# Patient Record
Sex: FEMALE | Race: OTHER | HISPANIC OR LATINO | Employment: FULL TIME | URBAN - METROPOLITAN AREA
[De-identification: names, ages, dates, MRNs, and addresses within clinical notes are randomized per-mention and may not be internally consistent; named-entity substitution may affect disease eponyms.]

---

## 2020-09-11 ENCOUNTER — HOSPITAL ENCOUNTER (OUTPATIENT)
Dept: LAB | Facility: MEDICAL CENTER | Age: 60
End: 2020-09-11
Attending: FAMILY MEDICINE
Payer: COMMERCIAL

## 2020-09-11 LAB — COVID ORDER STATUS COVID19: NORMAL

## 2020-09-11 PROCEDURE — U0003 INFECTIOUS AGENT DETECTION BY NUCLEIC ACID (DNA OR RNA); SEVERE ACUTE RESPIRATORY SYNDROME CORONAVIRUS 2 (SARS-COV-2) (CORONAVIRUS DISEASE [COVID-19]), AMPLIFIED PROBE TECHNIQUE, MAKING USE OF HIGH THROUGHPUT TECHNOLOGIES AS DESCRIBED BY CMS-2020-01-R: HCPCS

## 2020-09-11 PROCEDURE — C9803 HOPD COVID-19 SPEC COLLECT: HCPCS

## 2020-09-12 LAB
SARS-COV-2 RNA RESP QL NAA+PROBE: NOTDETECTED
SPECIMEN SOURCE: NORMAL

## 2020-12-21 ENCOUNTER — APPOINTMENT (OUTPATIENT)
Dept: RADIOLOGY | Facility: IMAGING CENTER | Age: 60
End: 2020-12-21
Attending: PHYSICIAN ASSISTANT
Payer: COMMERCIAL

## 2020-12-21 ENCOUNTER — OFFICE VISIT (OUTPATIENT)
Dept: URGENT CARE | Facility: CLINIC | Age: 60
End: 2020-12-21
Payer: COMMERCIAL

## 2020-12-21 VITALS
HEIGHT: 59 IN | OXYGEN SATURATION: 95 % | TEMPERATURE: 97.8 F | DIASTOLIC BLOOD PRESSURE: 62 MMHG | RESPIRATION RATE: 16 BRPM | HEART RATE: 68 BPM | SYSTOLIC BLOOD PRESSURE: 104 MMHG | BODY MASS INDEX: 26 KG/M2 | WEIGHT: 129 LBS

## 2020-12-21 DIAGNOSIS — R10.84 GENERALIZED ABDOMINAL PAIN: ICD-10-CM

## 2020-12-21 DIAGNOSIS — K59.00 CONSTIPATION, UNSPECIFIED CONSTIPATION TYPE: ICD-10-CM

## 2020-12-21 PROCEDURE — 99204 OFFICE O/P NEW MOD 45 MIN: CPT | Performed by: PHYSICIAN ASSISTANT

## 2020-12-21 PROCEDURE — 74019 RADEX ABDOMEN 2 VIEWS: CPT | Mod: TC | Performed by: PHYSICIAN ASSISTANT

## 2020-12-21 RX ORDER — VITAMIN B COMPLEX
1000 TABLET ORAL DAILY
COMMUNITY

## 2020-12-21 RX ORDER — LEVOFLOXACIN 750 MG/1
750 TABLET, FILM COATED ORAL DAILY
COMMUNITY

## 2020-12-21 RX ORDER — NICOTINE POLACRILEX 2 MG
GUM BUCCAL
COMMUNITY

## 2020-12-21 SDOH — HEALTH STABILITY: MENTAL HEALTH: HOW OFTEN DO YOU HAVE A DRINK CONTAINING ALCOHOL?: MONTHLY OR LESS

## 2020-12-21 ASSESSMENT — ENCOUNTER SYMPTOMS
CONSTIPATION: 1
ABDOMINAL PAIN: 1

## 2020-12-22 NOTE — PROGRESS NOTES
"Subjective:     Annie Kramer  is a 60 y.o. female who presents for Constipation (constipation for 3 weeks)    This is a new problem.  Patient presents to urgent care with 3-week history of constipation and generalized abdominal pain.  Patient reports that she is able to pass gas but is having great difficulty passing bowel movements.  She did try taking some over-the-counter psyllium tablets with no relief.  She spoke with her gynecologist who recommended that she try MiraLAX.  She has had 3 doses with no relief.  One of her providers placed a referral to gastroenterology however she is not able to get an appointment for 3 months.  Patient denies any fever or chills, vomiting.     Constipation  Associated symptoms include abdominal pain.         Review of Systems   Gastrointestinal: Positive for abdominal pain and constipation.   All other systems reviewed and are negative.    Allergies   Allergen Reactions   • Almitrine      Break out   • Amoxicillin      rashes   • Bactrim Ds      Breaking out     • Keflex      Rashes     • Penicillins      rashes   • Sulfa Drugs      rashes   • Zithromax [Azithromycin-Fd&C Blue #2 [place]]      Breaking out       Past medical history, family history, surgical history and social history are reviewed and updated in the record today.     Objective:   /62   Pulse 68   Temp 36.6 °C (97.8 °F)   Resp 16   Ht 1.499 m (4' 11\")   Wt 58.5 kg (129 lb)   SpO2 95%   BMI 26.05 kg/m²   Physical Exam  Vitals signs reviewed.   Constitutional:       General: She is not in acute distress.     Appearance: She is well-developed. She is not ill-appearing or toxic-appearing.   HENT:      Head: Normocephalic and atraumatic.      Right Ear: Tympanic membrane, ear canal and external ear normal.      Left Ear: Tympanic membrane, ear canal and external ear normal.      Nose: Nose normal.      Mouth/Throat:      Lips: Pink. No lesions.      Mouth: Mucous membranes are moist.      " Pharynx: Oropharynx is clear. Uvula midline. No oropharyngeal exudate.   Eyes:      General: Lids are normal.      Extraocular Movements: Extraocular movements intact.      Conjunctiva/sclera: Conjunctivae normal.      Pupils: Pupils are equal, round, and reactive to light.   Neck:      Musculoskeletal: Normal range of motion and neck supple.   Cardiovascular:      Rate and Rhythm: Normal rate and regular rhythm.      Heart sounds: Normal heart sounds. No murmur. No friction rub. No gallop.    Pulmonary:      Effort: Pulmonary effort is normal. No respiratory distress.      Breath sounds: Normal breath sounds.   Abdominal:      General: Bowel sounds are normal. There is no distension.      Palpations: Abdomen is soft. There is no mass.      Tenderness: There is generalized abdominal tenderness. There is no right CVA tenderness, left CVA tenderness, guarding or rebound. Negative signs include Siddiqui's sign and Rovsing's sign.   Musculoskeletal: Normal range of motion.         General: No tenderness or deformity.   Lymphadenopathy:      Head:      Right side of head: No submental, submandibular or tonsillar adenopathy.      Left side of head: No submental, submandibular or tonsillar adenopathy.      Cervical: No cervical adenopathy.      Upper Body:      Right upper body: No supraclavicular adenopathy.      Left upper body: No supraclavicular adenopathy.   Skin:     General: Skin is warm and dry.      Findings: No rash.   Neurological:      Mental Status: She is alert and oriented to person, place, and time.      Cranial Nerves: Cranial nerves are intact. No cranial nerve deficit.      Sensory: Sensation is intact. No sensory deficit.      Motor: Motor function is intact.      Coordination: Coordination is intact. Coordination normal.      Gait: Gait is intact.   Psychiatric:         Attention and Perception: Attention normal.         Mood and Affect: Mood and affect normal.         Speech: Speech normal.          Behavior: Behavior normal. Behavior is cooperative.         Thought Content: Thought content normal.         Judgment: Judgment normal.          Assessment/Plan:   1. Constipation, unspecified constipation type  - [id number] VIEWS; Future    2. Generalized abdominal pain  - [id number] VIEWS; Future    Start time: 7: 05  End Time:8:08  Total face to face time with patient: 47 ,minutes      Patient is extremely concerned about her cause for constipation.  She has multiple questions and concerns regarding abdominal pain, treatment already initiated for constipation, why treatments are not working, what additional treatments she could utilize, potential causes for her problem, potential adverse events related to constipation, etc.  Much time spent counseling patient regarding constipation, management, etc.    Given her continued ongoing concerns, x-rays obtained.    Xray is obtained, read by radiologist and reviewed by myself with findings as follows:      RADIOLOGY RESULTS   [id number] Views    Result Date: 12/21/2020 12/21/2020 7:48 PM HISTORY/REASON FOR EXAM:  Abdominal Pain; 3 weeks constipation, generalized abdominal pain. TECHNIQUE/EXAM DESCRIPTION AND NUMBER OF VIEWS:  2 supine and upright view(s) of the abdomen. COMPARISON: None FINDINGS: No free air is identified on this supine exam. There is an increased volume of stool throughout the colon consistent with constipation. No small bowel dilatation, organomegaly, or aberrant calcification is present.     Constipation pattern of the colon.           Significant reassurance and education is provided.  Recommend trial of fleets enema.  If this is not successful, recommend trial of magnesium citrate over-the-counter.  Patient may also consider utilization of MiraLAX up to 3 times per day as needed.  Handout provided regarding constipation with recommendation for stool softener, daily MiraLAX, daily fiber, ensuring she is getting enough water to drink in the  amount of approximately 64 ounces per day.  Recommend follow-up with primary care, recommend follow-up with GI once established and scheduled.    Upon entering exam room I ensured patient was wearing a mask.  This provider wore appropriate PPE throughout entire visit.  Patient wore mask entire visit except for a brief period while examining oropharynx.      Differential diagnosis, natural history, supportive care, and indications for immediate follow-up discussed.  Red flag warning symptoms and strict ER/follow-up precautions given.  The patient demonstrated a good understanding and agreed with the treatment plan.  Please note that this note was created using voice recognition speech to text software. Every effort has been made to correct obvious errors.  However, I expect there are errors of grammar and possibly context that were not discovered prior to finalizing the note  KIMBERLEY Pratt PA-C

## 2022-10-11 ENCOUNTER — APPOINTMENT (OUTPATIENT)
Dept: SLEEP MEDICINE | Facility: MEDICAL CENTER | Age: 62
End: 2022-10-11
Payer: COMMERCIAL

## 2023-01-27 ENCOUNTER — APPOINTMENT (OUTPATIENT)
Dept: SLEEP MEDICINE | Facility: MEDICAL CENTER | Age: 63
End: 2023-01-27
Payer: COMMERCIAL

## 2023-01-31 ENCOUNTER — OFFICE VISIT (OUTPATIENT)
Dept: SLEEP MEDICINE | Facility: MEDICAL CENTER | Age: 63
End: 2023-01-31
Payer: COMMERCIAL

## 2023-01-31 VITALS
OXYGEN SATURATION: 96 % | HEIGHT: 59 IN | SYSTOLIC BLOOD PRESSURE: 122 MMHG | BODY MASS INDEX: 26.65 KG/M2 | DIASTOLIC BLOOD PRESSURE: 78 MMHG | HEART RATE: 65 BPM | WEIGHT: 132.2 LBS | RESPIRATION RATE: 16 BRPM

## 2023-01-31 DIAGNOSIS — G47.33 OSA (OBSTRUCTIVE SLEEP APNEA): ICD-10-CM

## 2023-01-31 PROCEDURE — 99204 OFFICE O/P NEW MOD 45 MIN: CPT | Performed by: INTERNAL MEDICINE

## 2023-01-31 RX ORDER — LEVOTHYROXINE SODIUM 0.07 MG/1
75 TABLET ORAL DAILY
COMMUNITY

## 2023-01-31 ASSESSMENT — FIBROSIS 4 INDEX: FIB4 SCORE: 1.22

## 2023-01-31 NOTE — PROGRESS NOTES
CC: Follow-up for BRITNEY on CPAP at 12 cm water    HPI:  Annie Kramer is a 62 y.o. female kindly referred by Jorje Fernandez M.D. and Penelope Mcfarland MD for reevaluation of previously diagnosed BRITNEY.  Reportedly she had a sleep study done at [facility] several years ago and was placed on CPAP.  However, she has not used CPAP in the last 2 months does not wish to use it going forward.  Furthermore she evidently quested to have a female MD see her rather than a male physician.    We are trying to get a hold of [facility] to see if we can gain the compliance data.  We do not have any the records from [facility].    Unable to use her machine and her  is angry about her snoring. Was sent to a dentist for an appliance but she was concerned about expense and side effects.  Tried 3 different masks but couldn't find one that worked for her.        Her total Stewart score today is 9 out of 24.  She has identifies sleep apnea and loud snoring as her major problems.  She does suffer from sinus problems, allergies, and has had prior nasal surgery.    He generally goes to bed at 10 PM and falls asleep in about half an hour waking up at about 6 AM.  She experiences 3 nocturnal awakenings and fails to feel refreshed in the morning.  She does not nap during the day.  On the weekends she may stay up later and get up later by an hour or so.  She needs about 8 hours of sleep to feel rested.  She may watch TV in bed and does pets in the bedroom.  She has a regular bed partner.  She generally keeps her room at about 60 °F.  She does not use THC, CBD, or alcohol but does drink a couple of coffee in the morning.  She generally works about 8 hours.  For all the time symptoms include snoring.  Her most of the time symptoms include lack of sexual interest, irritability, and teeth grinding.  Her sometimes symptoms include sleepiness during the day, fatigue, sleep onset insomnia, GERD, night sweats, awakening with headaches,  difficulty breathing through her nose, choking on food, muscle weakness when laughing, difficulty concentrating, depression, and anxiety.      Past medical history significant for BRITNEY, anxiety, hypothyroidism, depression, anxiety, prediabetes, kidney stones, and former smoker.                Patient Active Problem List    Diagnosis Date Noted    BRITNEY (obstructive sleep apnea) 01/31/2023       Past Medical History:   Diagnosis Date    Chickenpox         History reviewed. No pertinent surgical history.    History reviewed. No pertinent family history.    Social History     Socioeconomic History    Marital status:      Spouse name: Not on file    Number of children: Not on file    Years of education: Not on file    Highest education level: Not on file   Occupational History    Not on file   Tobacco Use    Smoking status: Never    Smokeless tobacco: Never   Vaping Use    Vaping Use: Never used   Substance and Sexual Activity    Alcohol use: Yes     Comment: occ    Drug use: Not Currently    Sexual activity: Not on file   Other Topics Concern    Not on file   Social History Narrative    Not on file     Social Determinants of Health     Financial Resource Strain: Not on file   Food Insecurity: Not on file   Transportation Needs: Not on file   Physical Activity: Not on file   Stress: Not on file   Social Connections: Not on file   Intimate Partner Violence: Not on file   Housing Stability: Not on file       Current Outpatient Medications   Medication Sig Dispense Refill    Multiple Vitamins-Minerals (ZINC PO) Take  by mouth.      Cyanocobalamin (VITAMIN B 12 PO) Take  by mouth.      Coenzyme Q10 (COQ10 PO) Take  by mouth.      levothyroxine (SYNTHROID) 75 MCG Tab Take 75 mcg by mouth every day.      sertraline (ZOLOFT) 50 MG Tab Take 25 mg by mouth every day.      vitamin D (CHOLECALCIFEROL) 1000 Unit (25 mcg) Tab Take 1,000 Units by mouth every day.      Biotin 1 MG Cap Take  by mouth.      levoFLOXacin (LEVAQUIN)  "750 MG tablet Take 750 mg by mouth every day. (Patient not taking: Reported on 1/31/2023)       No current facility-administered medications for this visit.    \"CURRENT RX\"    ALLERGIES: Almitrine, Amoxicillin, Bactrim ds, Keflex, Penicillins, Sulfa drugs, and Zithromax [azithromycin-fd&c blue #2 [place]]    ROS    Constitutional: Denies fever, chills, sweats,  weight loss, fatigue.  Eyes: Denies vision loss, pain, drainage, double vision, glasses.  Ears/Nose/Mouth/Throat: Denies earache, difficulty hearing, rhinitis/nasal congestion, injury, recurrent sore throat, persistent hoarseness, decayed teeth/toothaches, ringing or buzzing in the ears.  Cardiovascular: Denies chest pain, tightness, palpitations, swelling in legs/feet, fainting, difficulty breathing when lying down but gets better when sitting up.   Respiratory: Denies shortness of breath, cough, sputum, wheezing, painful breathing, coughing up blood.   Sleep: per HPI  Gastrointestinal: Denies  difficulty swallowing, nausea, abdominal pain, diarrhea, constipation, heartburn.  Genitourinary: Denies  blood in urine, discharge, frequent urination.   Musculoskeletal: Denies painful joints, sore muscles, back pain.   Integumentary: Denies rashes, lumps, color changes.   Neurological: Denies frequent headaches,weakness, dizziness.    PHYSICAL EXAM      /78 (BP Location: Left arm, Patient Position: Sitting, BP Cuff Size: Adult)   Pulse 65   Resp 16   Ht 1.499 m (4' 11\")   Wt 60 kg (132 lb 3.2 oz)   SpO2 96%   BMI 26.70 kg/m²   Appearance: Well-nourished, well-developed, no acute distress  Eyes:  PERRLA, EOMI  ENMT: masked  Neck: Supple, trachea midline, no masses  Respiratory effort:  No intercostal retractions or use of accessory muscles  Lung auscultation:  No wheezes rhonchi rubs or rales  Cardiac: No murmurs, rubs, or gallops; regular rhythm, normal rate; no edema  Abdomen:  No tenderness, no organomegaly  Musculoskeletal:  Grossly normal; " gait and station normal; digits and nails normal  Skin:  No rashes, petechiae, cyanosis  Neurologic: without focal signs; oriented to person, time, place, and purpose; judgement intact  Psychiatric:  No depression, anxiety, agitation        Medical Decision Making:  The medical record was reviewed in its entirety including the referral notes, records from primary care, consultants notes, hospital records, labs, imaging, microbiology, immunology, and immunizations.  Care gaps identified and reviewed with the patient.    Diagnostic and titration nocturnal polysomnograms, home sleep apnea tests, continuous nocturnal oximetry results, multiple sleep latency tests, and compliance reports reviewed.        1. BRITNEY (obstructive sleep apnea)    Other orders  - Multiple Vitamins-Minerals (ZINC PO); Take  by mouth.  - Cyanocobalamin (VITAMIN B 12 PO); Take  by mouth.  - Coenzyme Q10 (COQ10 PO); Take  by mouth.  - levothyroxine (SYNTHROID) 75 MCG Tab; Take 75 mcg by mouth every day.      PLAN:   Thus far she is proven to be CPAP intolerant.  She is not been able to find a mask that seems to work for her.  She was then subsequently referred for a dental appliance but felt that they were too expensive and might not possibly work so she did not have one made.  We discussed behavioral modification and nasopharyngeal reconstructive surgery is other potential options.  Her BMI lowest not high and weight loss would likely not lead to a resolution of her sleep apnea.  Certainly would recommend surgery especially not knowing the severity of her BRITNEY.    We also discussed the hypopossible nerve stimulation implant.  We discussed the steps necessary enrollment.  I asked her to go to the inspire website for a review.  Since her last sleep study was done over 3 years ago we will do an updated home sleep apnea test to determine if she is a candidate.  She was interested in seeing a female physician so when she returns we will have her see   [facility] and they can discuss her inspire candidacy further.    The risks of untreated sleep apnea were discussed with the patient at length. Patients with BRITNEY are at increased risk of cardiovascular disease including systemic arterial hypertension, pulmonary arterial hypertension (transient or fixed), TIA, and an elevated risk of stroke, heart attack, sudden death, or arrhythmia between the hours of 11 PM and 6 AM. BRITNEY patients have an increased risk of motor vehicle accidents, type 2 diabetes, GERD, repetitive mechanical trauma to pharyngeal muscles with inflammation and denervation, frequent EEG arousals leading to nonrestorative sleep, excessive daytime sleepiness, fatigue, depression, poor pain control, irritability, and lower quality of life.  The patient was advised to avoid driving a motor vehicle when drowsy.    Positive airway pressure will favorably impact many of the adverse conditions and effects provoked by BRITNEY.    Have advised the patient to follow up with the appropriate healthcare practitioners for all other medical problems and issues. Discussed blood pressure management if needed. Discussed depression screening.            Return in about 1 year (around 1/31/2024).    Total time 45 minutes

## 2023-04-07 ENCOUNTER — APPOINTMENT (OUTPATIENT)
Dept: SLEEP MEDICINE | Facility: MEDICAL CENTER | Age: 63
End: 2023-04-07
Attending: INTERNAL MEDICINE
Payer: COMMERCIAL

## 2023-04-11 ENCOUNTER — HOME STUDY (OUTPATIENT)
Dept: SLEEP MEDICINE | Facility: MEDICAL CENTER | Age: 63
End: 2023-04-11
Attending: INTERNAL MEDICINE
Payer: COMMERCIAL

## 2023-04-11 DIAGNOSIS — G47.33 OSA (OBSTRUCTIVE SLEEP APNEA): ICD-10-CM

## 2023-04-11 PROCEDURE — 95800 SLP STDY UNATTENDED: CPT | Performed by: INTERNAL MEDICINE

## 2023-04-15 NOTE — PROCEDURES
DIAGNOSTIC HOME SLEEP TEST (HST) REPORT WatchPAT      PATIENT ID:  NAME:  Annie Kramer  MRN:               [id number]  YOB: 1960  Date of study: 04/11/2023  Sleep Time: 7 hours 4 minutes  Portable monitor/device used: type IV (portable monitor/device)      Impression:        1.  Moderate positional obstructive sleep apnea hypopnea with a PAT apnea hypopnea index (pAHI) of 21.8 (events per hour), a supine pAHI of 25.3, and a PAT respiratory disturbance index (pRDI) of 23.3 (events per hour.)  These findings are based on 7 channels recording of PAT signal with sleep staging, heart rate, pulse oximetry, actigraphy, body position, snoring and respiratory movement.     2. Oxygenation: O2 Sat. mean O2 sat was 89%,  the shakeel was 82%,  and the maximum O2 was 95%.  The O2 sat was at or  below 88% for 44.6 minutes of evaluation time. Oxygen Desaturation (>=4%) Index was 9.7 (events per hour.)  The mean HR was 47 BPM.      TECHNICAL DESCRIPTION: The patient underwent home sleep apnea testing using peripheral arterial tone signal technology(WatchPAT™). Monitoring was done with 7 channels recording of PAT signal with sleep staging, heart rate, pulse oximetry, actigraphy, body position, snoring and respiratory movement. Prior to using the device, the patient received verbal and written instructions for its application and was provided with the help desk phone number for additional telephonic instruction with 24-hour availability of qualified personnel to answer questions.      Respiratory events:    pRDI: Total 164 (REM index 36.4 /hr, NREM index 20.2 /hr, All Night 23.3 /hr)    3% pAHI: Total 153 (REM index 34.2 /hr, NREM index 18.8 /hr, All Night 21.8 /hr)    3% pAHIc: Total 4 (REM index 0.9 /hr, NREM index 0.6 /hr, All Night 0.6 /hr)    4% pAHI: Total 68 (All Night 9.7 /hr)    General sleep summary: . Total Recording Time is 7 hours 41 minutes and Total Sleep Time is 7 hours 4 minutes. The patient spent  279.4 minutes in the supine position and 145 minutes in the nonsupine position.  Snoring exceeded the 45 dB threshold for 0.0 minutes or 0% of the TST.      Recommendations:    1.  Options for PAP therapy include an in lab attended Pap titration versus an empiric challenge with auto titrating CPAP.  2.  Patients with sleep apnea are advised to avoid alcohol and sedatives and not to operate a motor vehicle while drowsy. In some cases alternative treatment options may prove effective in managing sleep apnea.  Alternative therapies include upper airway surgery, the use of a dental orthotic, weight loss and/or positional therapy. Clinical correlation is required.            Dr. Ant Banegas M.D.

## 2023-05-23 ENCOUNTER — OFFICE VISIT (OUTPATIENT)
Dept: SLEEP MEDICINE | Facility: MEDICAL CENTER | Age: 63
End: 2023-05-23
Attending: NURSE PRACTITIONER
Payer: COMMERCIAL

## 2023-05-23 VITALS
WEIGHT: 130.1 LBS | OXYGEN SATURATION: 96 % | SYSTOLIC BLOOD PRESSURE: 118 MMHG | HEART RATE: 65 BPM | HEIGHT: 59 IN | RESPIRATION RATE: 16 BRPM | DIASTOLIC BLOOD PRESSURE: 60 MMHG | BODY MASS INDEX: 26.23 KG/M2

## 2023-05-23 DIAGNOSIS — G47.33 OSA (OBSTRUCTIVE SLEEP APNEA): ICD-10-CM

## 2023-05-23 PROCEDURE — 3074F SYST BP LT 130 MM HG: CPT | Performed by: NURSE PRACTITIONER

## 2023-05-23 PROCEDURE — 3078F DIAST BP <80 MM HG: CPT | Performed by: NURSE PRACTITIONER

## 2023-05-23 PROCEDURE — 99212 OFFICE O/P EST SF 10 MIN: CPT | Performed by: NURSE PRACTITIONER

## 2023-05-23 PROCEDURE — 99214 OFFICE O/P EST MOD 30 MIN: CPT | Performed by: NURSE PRACTITIONER

## 2023-05-23 ASSESSMENT — FIBROSIS 4 INDEX: FIB4 SCORE: 1.22

## 2023-05-23 NOTE — PROGRESS NOTES
Chief Complaint   Patient presents with    Apnea     SS results       HPI:  Annie Kramer is a 62 y.o. year old female here today for follow-up on study results.  Last seen 1/31/2023 by Dr. Banegas.  Patient was previously diagnosed with BRITNEY.  Previous sleep study was done [facility] several years ago and was placed on CPAP.  Patient has not used CPAP since October 2022.  Patient was unable to use her CPAP, but had persistent snoring.  She was sent to a dentist for dental appliance, but concerned about expense and side effects.    Patient did sleep study to assess severity and rediagnosed.  Inspire device was discussed as well.  Was originally set up on CPAP in 12/21/2017.  She was using a ResMed N30 I.  Patient had severe snoring before treatment, but during treatment also experience snoring where her  moved into another room to sleep.  He also notes that the mask was out of place due to simple strapping and her thick hair.    Compliance data reviewed between January 31 and March 1, 2018 shows 4 days use with an average time of 6 hours and 31 minutes.  Previously on auto CPAP 4 to 8 cm/H2O.  AHI was 2.3.  No evidence of excessive mask leak.     Home sleep study (4/11/2023):   1.  Moderate positional obstructive sleep apnea hypopnea with a PAT apnea hypopnea index (pAHI) of 21.8 (events per hour), a supine pAHI of 25.3, and a PAT respiratory disturbance index (pRDI) of 23.3 (events per hour.)  These findings are based on 7 channels recording of PAT signal with sleep staging, heart rate, pulse oximetry, actigraphy, body position, snoring and respiratory movement.      2. Oxygenation: O2 Sat. mean O2 sat was 89%,  the shakeel was 82%,  and the maximum O2 was 95%.  The O2 sat was at or  below 88% for 44.6 minutes of evaluation time. Oxygen Desaturation (>=4%) Index was 9.7 (events per hour.)  The mean HR was 47 BPM.       ROS: As per HPI and otherwise negative if not stated.    Past Medical History:  "  Diagnosis Date    Chickenpox        History reviewed. No pertinent surgical history.    History reviewed. No pertinent family history.    Allergies as of 05/23/2023 - Reviewed 05/23/2023   Allergen Reaction Noted    Almitrine  12/21/2020    Amoxicillin  12/21/2020    Bactrim ds  12/21/2020    Keflex  12/21/2020    Penicillins  12/21/2020    Sulfa drugs  12/21/2020    Zithromax [azithromycin-fd&c blue #2 [place]]  12/21/2020        Vitals:  /60 (BP Location: Left arm, Patient Position: Sitting, BP Cuff Size: Adult)   Pulse 65   Resp 16   Ht 1.499 m (4' 11\")   Wt 59 kg (130 lb 1.6 oz)   SpO2 96%     Current medications as of today   Current Outpatient Medications   Medication Sig Dispense Refill    Cholecalciferol 75 MCG (3000 UT) Tab Take 3,000 Units by mouth every day.      cyanocobalamin (VITAMIN B12) 1000 MCG Tab Take 5,000 mcg by mouth every day.      Magnesium Oxide 250 MG Tab Take 750 mg by mouth every day.      Omega-3 300 MG Cap Take 4 Capsules by mouth every day.      Multiple Vitamins-Minerals (ZINC PO) Take  by mouth.      Cyanocobalamin (VITAMIN B 12 PO) Take  by mouth.      Coenzyme Q10 (COQ10 PO) Take  by mouth.      levothyroxine (SYNTHROID) 75 MCG Tab Take 75 mcg by mouth every day.      levoFLOXacin (LEVAQUIN) 750 MG tablet Take 750 mg by mouth every day.      sertraline (ZOLOFT) 50 MG Tab Take 25 mg by mouth every day.      vitamin D (CHOLECALCIFEROL) 1000 Unit (25 mcg) Tab Take 1,000 Units by mouth every day.      Biotin 1 MG Cap Take  by mouth.       No current facility-administered medications for this visit.         Physical Exam:   Gen:           Alert and oriented, No apparent distress. Mood and affect appropriate, normal interaction with examiner.  Eyes:          PERRL, EOM intact, sclere white, conjunctive moist.  Ears:          Not examined.   Hearing:     Grossly intact.  Nose:          Normal, no lesions or deformities.  Dentition:    Good dentition.  Oropharynx: "   Tongue normal, posterior pharynx without erythema or exudate.  Neck:        Supple, trachea midline, no masses.  Respiratory Effort: No intercostal retractions or use of accessory muscles.   Lung Auscultation:      Clear to auscultation bilaterally; no rales, rhonchi or wheezing.  CV:            Regular rate and rhythm. No murmurs, rubs or gallops.  Abd:           Not examined.   Lymphadenopathy: Not examined.  Gait and Station: Normal.  Digits and Nails: No clubbing, cyanosis, petechiae, or nodes.   Cranial Nerves: II-XII grossly intact.  Skin:        No rashes, lesions or ulcers noted.               Ext:           No cyanosis or edema.      Assessment:  1. BRITNEY (obstructive sleep apnea)            Plan:   I reviewed with the patient the pathophysiology of obstructive sleep apnea, as well as potential cardiac and neurologic risks associated with untreated sleep apnea including CAD, HTN, pulmonary arterial hypertension, cardiac arrhythmias, heart attack or stroke.  BRITNEY patient's have increased risk of motor vehicle accidents, DM type II, chronic kidney disease and nonalcoholic liver disease.  She is cautioned against driving while sleepy for her safety and safety of others on the road. We reviewed treatment modalities for sleep apnea including CPAP/BiPAP therapy, ENT referral, dental appliance.      Patient presents for review of sleep study which showed moderate BRITNEY with nocturnal hypoxia.  Pradip treatment options at this time patient would not like to move forward with any invasive procedures.  Patient was also to be fitted for dental appliance, but decided against it.  Mask fitting was provided for patient today and patient was given an Bryon fullface mask.  Patient prefers fit of this mask over previous.  Patient to use CPAP nightly for at least 4 hours for optimal benefit.  Will return in 4 weeks for reassessment on compliance.    Please note that this dictation was created using voice recognition software. I  have made every reasonable attempt to correct obvious errors, but it is possible there are errors of grammar and possibly content that I did not discover before finalizing the note.

## 2023-07-14 NOTE — PROGRESS NOTES
[facility] Follow-up Visit    Date of Visit: 8/1/2023     CC:  Follow-up for BRITNEY management      HPI:  Annie Kramer is a very pleasant 62 y.o. year old female never smoker, with a PMHx of BRITNEY on CPAP, HLD who presented to the [facility] for a regular follow up. Last seen in the office on 5/23/2023 with TEODORO Duong.     Patient presents for compliance.  ***    Sleep more restful with CPAP usage    Denies morning headaches.    Denies daytime drowsiness / driving drowsy.     Denies any issues falling asleep.      Snoring / Gasping / Apneas    Palpitations    Dry mouth    Aerophagia    Mask leak / Skin irritation    Goes to bed:  Wakes up:  Naps (frequency and duration):  Awakenings:  Issues falling back to sleep?        DME provider: ***  Device: ***  Settings:***  When:***  Mask: ***  Chin strap: {YES/NO:20266}    Cleaning regimen: ***    Compliance:  Compliance data reviewed showing ***% usage > 4hours in last 30 *** days. Average AHI *** events/hour. 95% pressure *** CWP. 95% leaks *** L/min. Patient continues to use and benefit from machine. ***     Sleep History:  HSS 4/11/2023-      Patient Active Problem List    Diagnosis Date Noted    BRITNEY (obstructive sleep apnea) 01/31/2023     Past Medical History:   Diagnosis Date    Chickenpox       No past surgical history on file.  No family history on file.  Social History     Socioeconomic History    Marital status:      Spouse name: Not on file    Number of children: Not on file    Years of education: Not on file    Highest education level: Not on file   Occupational History    Not on file   Tobacco Use    Smoking status: Never    Smokeless tobacco: Never   Vaping Use    Vaping Use: Never used   Substance and Sexual Activity    Alcohol use: Yes     Comment: occ    Drug use: Not Currently    Sexual activity: Not on file   Other Topics Concern    Not on file   Social History Narrative    Not on file     Social Determinants of Health      Financial Resource Strain: Not on file   Food Insecurity: Not on file   Transportation Needs: Not on file   Physical Activity: Not on file   Stress: Not on file   Social Connections: Not on file   Intimate Partner Violence: Not on file   Housing Stability: Not on file     Current Outpatient Medications   Medication Sig Dispense Refill    Cholecalciferol 75 MCG (3000 UT) Tab Take 3,000 Units by mouth every day.      cyanocobalamin (VITAMIN B12) 1000 MCG Tab Take 5,000 mcg by mouth every day.      Magnesium Oxide 250 MG Tab Take 750 mg by mouth every day.      Omega-3 300 MG Cap Take 4 Capsules by mouth every day.      Multiple Vitamins-Minerals (ZINC PO) Take  by mouth.      Cyanocobalamin (VITAMIN B 12 PO) Take  by mouth.      Coenzyme Q10 (COQ10 PO) Take  by mouth.      levothyroxine (SYNTHROID) 75 MCG Tab Take 75 mcg by mouth every day.      levoFLOXacin (LEVAQUIN) 750 MG tablet Take 750 mg by mouth every day.      sertraline (ZOLOFT) 50 MG Tab Take 25 mg by mouth every day.      vitamin D (CHOLECALCIFEROL) 1000 Unit (25 mcg) Tab Take 1,000 Units by mouth every day.      Biotin 1 MG Cap Take  by mouth.       No current facility-administered medications for this visit.      ALLERGIES: Almitrine, Amoxicillin, Bactrim ds, Keflex, Penicillins, Sulfa drugs, and Zithromax [azithromycin-fd&c blue #2 [place]]    ROS:  Constitutional: Denies fever, chills, sweats,  weight loss, fatigue  Cardiovascular: Denies chest pain, tightness, palpitations, swelling in legs/feet  Respiratory: Denies shortness of breath, cough, sputum, wheezing, painful breathing   Sleep: per HPI  Gastrointestinal: Denies  difficulty swallowing, nausea, abdominal pain, diarrhea, constipation, heartburn.  Musculoskeletal: Denies painful joints, sore muscles,       PHYSICAL EXAM:  There were no vitals taken for this visit.  Appearance: Well-nourished, well-developed, no acute distress  Eyes:  No scleral icterus , EOMI  ENMT: No redness of the  oropharynx***  Lung auscultation:  No wheezes rhonchi rubs or rales***  Cardiac: No murmurs, rubs, or gallops; regular rhythm, normal rate; no edema***  Musculoskeletal:  Grossly normal; gait and station normal; digits and nails normal  Skin:  No rashes, petechiae, cyanosis  Neurologic: without focal signs; oriented to person, time, place, and purpose; judgement intact  Psychiatric:  No depression, anxiety, agitation  Mallampati score: Class ***    Assessment and Plan:    The medical record was reviewed.    Diagnostic and titration nocturnal polysomnogram's, home sleep apnea tests, continuous nocturnal oximetry results, multiple sleep latency tests, and compliance reports reviewed.    Problem List Items Addressed This Visit    None      PLAN: ****    1. Sleep Apnea:    The pathophysiology of sleep anea and the increased risk of cardiovascular morbidity from untreated sleep apnea is discussed in detail with the patient.  Urged to avoid supine sleep, weight gain and alcoholic beverages since all of these can worsen sleep apnea. Cautioned against drowsy driving. If feeling sleepy while driving, pull over for a break/nap, rather than persist on the road, in the interest of own safety and that of others on the road.    Compliance download was reviewed and discussed with the patient.     - Order placed for mask and supplies   - Compliance was reinforced  - Recommended the patient against the use of Ozone , such as SoClean  - Clean supplies a couple times a week with dish soap and water and air dry  - Recommended the patient change out supplies as recommended for best mask fit and usage of the machine  - Advised patient to reach out via MyChart if any questions or concerns should arise.   - Equipment replacement schedule:  Mask cushion every month  Nasal pillows 2 times per month  Mask every 6 months  Head gear every 6 months  Tubing every 3 months  Ultra-fine filters 2 times per month  Foam filter every 6  months  Humidifier chamber every 6 months  Chin strap every 6 months    Has been advised to continue the current ***, clean equipment frequently, and get new mask and supplies as allowed by insurance and DME. Recommend an earlier appointment, if significant treatment barriers develop.    The risks of untreated sleep apnea were discussed with the patient at length. Patients with sleep apnea are at increased risk of cardiovascular disease including coronary artery disease, systemic arterial hypertension, pulmonary arterial hypertension, cardiac arrythmias, and stroke. The patient was advised to avoid driving a motor vehicle when drowsy.    Positive airway pressure will favorably impact many of the adverse conditions and effects provoked by sleep apnea.    Have advised the patient to follow up with the appropriate healthcare practitioners for all other medical problems and issues.    No follow-ups on file.      Please note portions of this record was created using voice recognition software. I have made every reasonable attempt to correct obvious errors, but I expect that there are errors of grammar and possibly content I did not discover before finalizing the note.    Time spent in record review prior to patient arrival, reviewing results, and in face-to-face encounter totaled *** min.  __________  TEODORO Hanks  Pulmonary & Sleep Medicine  [facility]

## 2023-08-01 ENCOUNTER — APPOINTMENT (OUTPATIENT)
Dept: SLEEP MEDICINE | Facility: MEDICAL CENTER | Age: 63
End: 2023-08-01
Payer: COMMERCIAL

## 2023-09-25 ENCOUNTER — APPOINTMENT (OUTPATIENT)
Dept: SLEEP MEDICINE | Facility: MEDICAL CENTER | Age: 63
End: 2023-09-25
Attending: PHYSICIAN ASSISTANT
Payer: COMMERCIAL

## 2023-10-16 ENCOUNTER — HOSPITAL ENCOUNTER (OUTPATIENT)
Dept: LAB | Facility: MEDICAL CENTER | Age: 63
End: 2023-10-16
Payer: COMMERCIAL

## 2023-10-16 PROCEDURE — 91065 BREATH HYDROGEN/METHANE TEST: CPT

## 2023-10-16 PROCEDURE — 36415 COLL VENOUS BLD VENIPUNCTURE: CPT

## 2023-10-25 LAB — TEST NAME 95000: NORMAL

## 2023-11-03 ENCOUNTER — HOSPITAL ENCOUNTER (OUTPATIENT)
Dept: LAB | Facility: MEDICAL CENTER | Age: 63
End: 2023-11-03
Payer: COMMERCIAL

## 2023-11-03 LAB
ANION GAP SERPL CALC-SCNC: 11 MMOL/L (ref 7–16)
BUN SERPL-MCNC: 22 MG/DL (ref 8–22)
CALCIUM SERPL-MCNC: 9.1 MG/DL (ref 8.5–10.5)
CHLORIDE SERPL-SCNC: 103 MMOL/L (ref 96–112)
CO2 SERPL-SCNC: 23 MMOL/L (ref 20–33)
CREAT SERPL-MCNC: 0.76 MG/DL (ref 0.5–1.4)
GFR SERPLBLD CREATININE-BSD FMLA CKD-EPI: 87 ML/MIN/1.73 M 2
GLUCOSE SERPL-MCNC: 80 MG/DL (ref 65–99)
POTASSIUM SERPL-SCNC: 4.1 MMOL/L (ref 3.6–5.5)
SODIUM SERPL-SCNC: 137 MMOL/L (ref 135–145)

## 2023-11-03 PROCEDURE — 36415 COLL VENOUS BLD VENIPUNCTURE: CPT

## 2023-11-03 PROCEDURE — 80048 BASIC METABOLIC PNL TOTAL CA: CPT

## 2023-11-04 ENCOUNTER — HOSPITAL ENCOUNTER (OUTPATIENT)
Dept: RADIOLOGY | Facility: MEDICAL CENTER | Age: 63
End: 2023-11-04
Payer: COMMERCIAL

## 2023-11-04 DIAGNOSIS — I67.1 CEREBRAL ANEURYSM, NONRUPTURED: ICD-10-CM

## 2023-11-04 PROCEDURE — 700117 HCHG RX CONTRAST REV CODE 255

## 2023-11-04 PROCEDURE — 70496 CT ANGIOGRAPHY HEAD: CPT

## 2023-11-04 RX ADMIN — IOHEXOL 80 ML: 350 INJECTION, SOLUTION INTRAVENOUS at 14:51

## 2023-11-15 NOTE — PROGRESS NOTES
Neuro Interventional Service Consultation      Re: Annie Kramer     MRN: [id number]   : 1960    Annie Kramer was referred to our service by Kiesha Dobbins MD. She is a 63 y.o. female seen in clinic for evaluation and possible intracranial aneurysm intervention. She is also under the care of Penelope Mcfarland MD and Lamin Vance MD (urology).    History of Present Illness:   presents with an incidentally found unruptured left ICA aneurysm. She initially presented for evaluation for aneurysm due to twin sister having an aneurysm, along with FMD, was noted on an otherwise negative CTA. She has a history of contrast reaction with hives, and sleep apnea. She has been referred to the Neuro Interventional Service for evaluation and management of this finding.     She is seen today for review of imaging studies and discussion of possible intracranial aneurysm repair. Today, the patient reports a history of occipital headaches from a prior accident that resolved after focal injections with her PCP. She denies sentinel headache and focal neurologic weakness. She reports visual changes after running. There is a family history of unruptured intracranial aneurysm in her sister, dementia in her mother. She states she is an overall anxious person but is not particularly worried about the presence of the aneurysm. She had an aneurysm evaluation in [place], where they recommended observation. Blood pressure is low without medications. There is no history of smoking. She denies current or past methamphetamine use. The patient has had anesthesia in the past and reports no problems or aftereffects. The patient has never taken DAPT in the past and reports microscopic hematuria of unknown etiology. She denies any history of major bleeding, including epistaxis, hemoptysis, hematemesis, and melena. 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 She is unaccompanied by a support  person to today's consultation. She takes care of her  at home, who has had a recent illness and is unwell.    Family history of aneurysm: yes, twin sister  Hyperlipidemia: no   Hypertension: no  Smoking: never    Past Medical History:   Diagnosis Date    Chickenpox      No past surgical history on file.  Social History     Socioeconomic History    Marital status:      Spouse name: Not on file    Number of children: Not on file    Years of education: Not on file    Highest education level: Not on file   Occupational History    Not on file   Tobacco Use    Smoking status: Never    Smokeless tobacco: Never   Vaping Use    Vaping Use: Never used   Substance and Sexual Activity    Alcohol use: Yes     Comment: occ    Drug use: Not Currently    Sexual activity: Not on file   Other Topics Concern    Not on file   Social History Narrative    Not on file     Social Determinants of Health     Financial Resource Strain: Not on file   Food Insecurity: Not on file   Transportation Needs: Not on file   Physical Activity: Not on file   Stress: Not on file   Social Connections: Not on file   Intimate Partner Violence: Not on file   Housing Stability: Not on file     No family history on file.    Review of Systems   Constitutional: Negative.  Negative for chills, diaphoresis, fever, malaise/fatigue and weight loss.   Respiratory: Negative.     Cardiovascular: Negative.    Gastrointestinal: Negative.    Genitourinary:  Positive for hematuria.   Skin: Negative.    Neurological:  Negative for sensory change, speech change, focal weakness and weakness.   Psychiatric/Behavioral:  Negative for substance abuse.        A comprehensive 14-point review of systems was negative except as described above.     Labs:    Latest Reference Range & Units Most Recent   RBC 4.19 - 5.21 x10e6/uL 4.59 (E)  8/4/23 15:01   Hemoglobin 12.3 - 16.0 g/dL 14.2 (E)  8/4/23 15:01   Hematocrit 36.0 - 47.0 % 41.5 (E)  8/4/23 15:01   MCV 81.0 - 99.0 fL  90.4 (E)  8/4/23 15:01   MCH 28.0 - 33.8 pg 30.9 (E)  8/4/23 15:01   MCHC 33.1 - 36.5 g/dL 34.2 (E)  8/4/23 15:01   RDW 11.8 - 14.0 % 13.4 (E)  8/4/23 15:01   Platelet Count 146 - 390 x10E3/uL 158 (E)  8/4/23 15:01   MPV 6.4 - 10.2 fL 10.3 (H) (E)  8/4/23 15:01   Neutrophils-Polys 41.7 - 82.3 % 83.5 (H) (E)  8/4/23 15:01   Neutrophils (Absolute) 1.40 - 8.00 x10E3/uL 5.70 (E)  8/4/23 15:01   Lymphocytes 10.8 - 44.4 % 5.4 (L) (E)  8/4/23 15:01   Lymphs (Absolute) 1.00 - 5.20 x10E3/uL 0.40 (L) (E)  8/4/23 15:01   Monocytes 5.0 - 12.8 % 9.4 (E)  8/4/23 15:01   Monos (Absolute) 0.16 - 1.00 x10E3/uL 0.60 (E)  8/4/23 15:01   Eosinophils 0.0 - 6.6 % 1.0 (E)  8/4/23 15:01   Eos (Absolute) 0.00 - 0.80 x10E3/uL 0.10 (E)  8/4/23 15:01   Basophils 0.0 - 1.3 % 0.7 (E)  8/4/23 15:01   Baso (Absolute) 0.00 - 0.30 x10E3/uL 0.00 (E)  8/4/23 15:01   Sodium 135 - 145 mmol/L 137  11/3/23 12:36   Potassium 3.6 - 5.5 mmol/L 4.1  11/3/23 12:36   Chloride 96 - 112 mmol/L 103  11/3/23 12:36   Co2 20 - 33 mmol/L 23  11/3/23 12:36   Anion Gap 7.0 - 16.0  11.0  11/3/23 12:36   Glucose 65 - 99 mg/dL 80  11/3/23 12:36   Bun 8 - 22 mg/dL 22  11/3/23 12:36   Creatinine 0.50 - 1.40 mg/dL 0.76  11/3/23 12:36   GFR (CKD-EPI) >60 mL/min/1.73 m 2 87  11/3/23 12:36   Calcium 8.5 - 10.5 mg/dL 9.1  11/3/23 12:36   AST(SGOT) 15 - 41 U/L 16 (E)  8/4/23 15:01   ALT(SGPT) 14 - 54 U/L 13 (L) (E)  8/4/23 15:01   Alkaline Phosphatase 38 - 126 U/L 61 (E)  8/4/23 15:01   Total Bilirubin 0.4 - 2.0 mg/dL 0.4 (E)  8/4/23 15:01   Total Protein 6.4 - 8.2 g/dL 7.2 (E)  8/4/23 15:01   Globulin 2.6 - 3.1 g/dL 3.2 (H) (E)  8/4/23 15:01   Glycohemoglobin 4.8 - 5.6 % 5.9 (H) (E)  10/18/23 05:18   Glom Filt Rate, Est >60 mL/min/1.7 74 (E)  8/4/23 15:01   Miscellaneous Lab Result  SEE NOTE  10/16/23 07:42   Albumin 3.5 - 4.8 g/dL 4.0 (E)  8/4/23 15:01   Ag Ratio 1.0 - 2.2 ratio 1.3 (E)  8/4/23 15:01   (H): Data is abnormally high  (L): Data is abnormally low  (E): External lab  result    Radiology:   CTA Head 11/4/23 at [facility]:  1.  6.5 x 5.5 mm distal left ICA paraophthalmic aneurysm. Consider neuro interventional radiology consultation.  2.  No other intracranial aneurysm or high flow vascular malformation.  3.  No large vessel occlusion or hemodynamically significant stenosis.  4.  Beaded bilateral distal vertebral arteries raises suspicion for fibromuscular dysplasia.  5.  No acute intracranial abnormality.        Current Outpatient Medications   Medication Sig Dispense Refill    Cholecalciferol 75 MCG (3000 UT) Tab Take 3,000 Units by mouth every day.      cyanocobalamin (VITAMIN B12) 1000 MCG Tab Take 5,000 mcg by mouth every day.      Magnesium Oxide 250 MG Tab Take 750 mg by mouth every day.      Omega-3 300 MG Cap Take 4 Capsules by mouth every day.      Multiple Vitamins-Minerals (ZINC PO) Take  by mouth.      Cyanocobalamin (VITAMIN B 12 PO) Take  by mouth.      Coenzyme Q10 (COQ10 PO) Take  by mouth.      levothyroxine (SYNTHROID) 75 MCG Tab Take 75 mcg by mouth every day.      levoFLOXacin (LEVAQUIN) 750 MG tablet Take 750 mg by mouth every day.      sertraline (ZOLOFT) 50 MG Tab Take 25 mg by mouth every day.      vitamin D (CHOLECALCIFEROL) 1000 Unit (25 mcg) Tab Take 1,000 Units by mouth every day.      Biotin 1 MG Cap Take  by mouth.       No current facility-administered medications for this visit.       Allergies   Allergen Reactions    Almitrine      Break out    Amoxicillin      rashes    Bactrim Ds      Breaking out      Iodine Contrast [Diagnostic X-Ray Materials] Hives     Pt. Had a CTA Head today w/ contrast and developed hives following injection. 11/4/23    Keflex      Rashes      Penicillins      rashes    Sulfa Drugs      rashes    Zithromax [Azithromycin-Fd&C Blue #2 [place]]      Breaking out         Physical Exam  Constitutional:       General: She is not in acute distress.     Appearance: She is not diaphoretic.   HENT:      Head: Normocephalic.    Eyes:      General: No scleral icterus.  Pulmonary:      Effort: Pulmonary effort is normal. No respiratory distress.   Abdominal:      General: There is no distension.   Skin:     General: Skin is warm and dry.      Coloration: Skin is not pale.      Findings: No erythema or rash.   Neurological:      General: No focal deficit present.      Mental Status: She is alert and oriented to person, place, and time. She is not disoriented.      Cranial Nerves: No cranial nerve deficit or facial asymmetry.      Sensory: Sensation is intact.      Motor: No weakness or tremor.      Coordination: Coordination normal.      Gait: Gait is intact. Gait normal.   Psychiatric:         Mood and Affect: Mood and affect normal.         Behavior: Behavior normal.         Thought Content: Thought content normal.         Cognition and Memory: Memory normal.         Judgment: Judgment normal.     PHASES Aneurysm Risk Score: 0    The 5-year absolute risk of rupture based on score is currently estimated to be:    ?2 points: 0.4%  3 points: 0.7%  4 points: 0.9%  5 points: 1.3%  6 points: 1.7%  7 points: 2.4%  8 points: 3.2%  9 points: 4.3%  10 points: 5.3%  11 points: 7.2%  ? 12 points: 17.8%    Impression:   1. 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  2. Fibromuscular dysplasia.  3. Contrast allergy.  4. Obstructive sleep apnea.  5. Claustrophobia with MRI.    Plan:   Cali Oseguera MD has reviewed 's history and imaging studies, examined the patient, and discussed treatment options.  has an intracranial aneurysm that is amenable to intervention, however she is managing her  after he was very ill, and asks to defer any decision with intervention for 3 months.  Aneurysms of this size and in this location carry a statistical cumulative 5 year rupture risk of 0.4%, however we explained that even small aneurysms can rupture. She also has FMD  and we discussed what that means to the patient and that recommended therapy is a daily baby aspirin, however her urologist would need to clear her to take this medication due to her hematuria. Overall interventional procedure risk is approximately 3%. We discussed the method of the procedure at length including the possibility of the use of catheters, contrast, and xrays to facilitate diagnostic procedures and stents and embolic agents to perform endovascular intervention. We additionally discussed the procedure risks, including bleeding and infection, damage to the arteries, reaction to any medications given during the procedure, side effects of contrast, radiation exposure, in stent stenosis, coil or stent migration, mechanical failure, stroke, hemorrhage, and death. There is a risk for unanticipated neurologic or non neurologic complications. There is a chance the aneurysm may ultimately not be amenable to endovascular intervention. After the procedure, there is a chance that the aneurysm could recur. We reviewed known risk factors for vascular health that include hypertension, hyperglycemia, hyperlipidemia, and tobacco use, and modifiable risk factors were discussed. We explained that the patient will need to have ongoing surveillance imaging after the procedure, which will be managed by us, and that future treatment depends on multiple factors including lab studies, imaging, and performance status. We discussed alternatives to the procedure including surveillance and surgical intervention, which has been discussed with her surgeon. The patient verbalizes understanding of risks, benefits, and alternatives and elects to proceed. We discussed the need for aspirin and Brilinta prior to the procedure and for up to 6 months post procedure if a stent is placed. Side effects of antiplatelet therapy, specifically bleeding, were discussed with instructions given should the patient develop minor or major bleeding.  Written pre- and post- procedure care instructions were provided. We discussed signs of a sentinel headache and stroke with instructions to call an ambulance for the onset of these symptoms. We will schedule an MRA w/o vaibhav, with anesthesia in February.     TEODORO Dias with Cali Oseguera MD  Neuro Interventional Service   [facility]   [id number])  [place] [address]  [phone]      I, Blaine Oseguera, the interventional neuroradiologist attest that I personally reviewed the relevant imaging studies (MRI, CT and Angiograms) of this patient and also discussed the history, physical exam findings and correlated with the APNP's physical exam and agree with the above note. I also personally discussed the findings and plan of management with the patient.

## 2023-11-16 ENCOUNTER — HOSPITAL ENCOUNTER (OUTPATIENT)
Dept: RADIOLOGY | Facility: MEDICAL CENTER | Age: 63
End: 2023-11-16
Attending: NEUROLOGICAL SURGERY
Payer: COMMERCIAL

## 2023-11-16 DIAGNOSIS — I67.1 CEREBRAL ANEURYSM, NONRUPTURED: ICD-10-CM

## 2023-11-16 ASSESSMENT — ENCOUNTER SYMPTOMS
SENSORY CHANGE: 0
CHILLS: 0
DIAPHORESIS: 0
WEIGHT LOSS: 0
FEVER: 0
CARDIOVASCULAR NEGATIVE: 1
RESPIRATORY NEGATIVE: 1
WEAKNESS: 0
GASTROINTESTINAL NEGATIVE: 1
CONSTITUTIONAL NEGATIVE: 1
SPEECH CHANGE: 0
FOCAL WEAKNESS: 0

## 2023-11-16 ASSESSMENT — LIFESTYLE VARIABLES: SUBSTANCE_ABUSE: 0

## 2023-11-28 ENCOUNTER — APPOINTMENT (OUTPATIENT)
Dept: SLEEP MEDICINE | Facility: MEDICAL CENTER | Age: 63
End: 2023-11-28
Attending: PHYSICIAN ASSISTANT
Payer: COMMERCIAL

## 2024-04-16 NOTE — PROGRESS NOTES
Message received from imaging scheduler that pt declined to schedule MRA with anesthesia as discussed, preferring to self sedate.  asked that pt be contacted with options. Call placed, message left for pt to return call. Will explain that if she does not have an active Rx for anxiolysis, she may be hilary either with anesthesia or facility- provided sedation.

## 2024-04-17 ENCOUNTER — RESEARCH ENCOUNTER (OUTPATIENT)
Dept: RESEARCH | Facility: MEDICAL CENTER | Age: 64
End: 2024-04-17
Payer: COMMERCIAL

## 2024-04-17 NOTE — PROGRESS NOTES
Pt returned APRN's call regarding MRA w/ sedation and left message that she is declining GA due to delay in appointment availability and requesting sedation instead. Called back, got VM and explained we will ensure she has an appt for facility- administered sedation unless she has an existing rx from one of her other providers. CB information provided for additional questions/ discussion.

## 2024-04-18 ENCOUNTER — HOSPITAL ENCOUNTER (OUTPATIENT)
Dept: RADIOLOGY | Facility: MEDICAL CENTER | Age: 64
End: 2024-04-18
Attending: OBSTETRICS & GYNECOLOGY
Payer: COMMERCIAL

## 2024-04-19 NOTE — RESEARCH NOTE
Patient called main line to schedule. Scheduled at [facility] for on-site collection. Patient needs to sign consent from onsite as well.

## 2024-04-28 ENCOUNTER — APPOINTMENT (OUTPATIENT)
Dept: RADIOLOGY | Facility: MEDICAL CENTER | Age: 64
End: 2024-04-28
Attending: NURSE PRACTITIONER
Payer: MEDICARE

## 2024-04-28 ENCOUNTER — HOSPITAL ENCOUNTER (OUTPATIENT)
Dept: RADIOLOGY | Facility: MEDICAL CENTER | Age: 64
End: 2024-04-28
Attending: NURSE PRACTITIONER
Payer: COMMERCIAL

## 2024-04-28 DIAGNOSIS — I67.1 NONRUPTURED CEREBRAL ANEURYSM: ICD-10-CM

## 2024-04-28 PROCEDURE — 70544 MR ANGIOGRAPHY HEAD W/O DYE: CPT

## 2024-04-29 NOTE — PROGRESS NOTES
MRA report indicates no change in untreated, unruptured left ICA fusiform aneurysm. Call placed to [facility] with return call information. Will review imaging w/Dr. Oseguera for his personal evaluation and call pt with his recommendations next week.

## 2024-05-03 ENCOUNTER — APPOINTMENT (OUTPATIENT)
Dept: RESEARCH | Facility: MEDICAL CENTER | Age: 64
End: 2024-05-03
Payer: COMMERCIAL

## 2024-05-03 DIAGNOSIS — Z00.6 RESEARCH STUDY PATIENT: ICD-10-CM

## 2024-05-03 NOTE — RESEARCH NOTE
Confirmed with the participant they do not have a designated provider whom they would like study results shared with. Patient will have an opportunity to share the results with any providers of their choosing in the future by accessing their results in [employer].

## 2024-05-05 ENCOUNTER — OFFICE VISIT (OUTPATIENT)
Dept: URGENT CARE | Facility: CLINIC | Age: 64
End: 2024-05-05
Payer: MEDICARE

## 2024-05-05 VITALS
RESPIRATION RATE: 14 BRPM | BODY MASS INDEX: 21.97 KG/M2 | SYSTOLIC BLOOD PRESSURE: 122 MMHG | OXYGEN SATURATION: 98 % | WEIGHT: 109 LBS | HEART RATE: 98 BPM | HEIGHT: 59 IN | DIASTOLIC BLOOD PRESSURE: 82 MMHG | TEMPERATURE: 98.8 F

## 2024-05-05 DIAGNOSIS — J22 LRTI (LOWER RESPIRATORY TRACT INFECTION): ICD-10-CM

## 2024-05-05 PROCEDURE — 3079F DIAST BP 80-89 MM HG: CPT | Performed by: PHYSICIAN ASSISTANT

## 2024-05-05 PROCEDURE — 99203 OFFICE O/P NEW LOW 30 MIN: CPT | Performed by: PHYSICIAN ASSISTANT

## 2024-05-05 PROCEDURE — 3074F SYST BP LT 130 MM HG: CPT | Performed by: PHYSICIAN ASSISTANT

## 2024-05-05 RX ORDER — DOXYCYCLINE HYCLATE 100 MG
100 TABLET ORAL 2 TIMES DAILY
Qty: 14 TABLET | Refills: 0 | Status: SHIPPED | OUTPATIENT
Start: 2024-05-05 | End: 2024-05-12

## 2024-05-05 ASSESSMENT — ENCOUNTER SYMPTOMS
MYALGIAS: 0
NAUSEA: 0
SHORTNESS OF BREATH: 1
VOMITING: 0
SPUTUM PRODUCTION: 1
SINUS PAIN: 0
COUGH: 1
SORE THROAT: 0
HEADACHES: 0
WHEEZING: 0
ABDOMINAL PAIN: 0
RHINORRHEA: 1
HEMOPTYSIS: 0
FEVER: 0
DIARRHEA: 0
CHILLS: 0

## 2024-05-05 ASSESSMENT — FIBROSIS 4 INDEX: FIB4 SCORE: 1.52

## 2024-05-05 NOTE — PROGRESS NOTES
Subjective     Annie Kramer is a 63 y.o. female who presents with Cough (Cough congestion, headaches x 7 days )            Cough  This is a new problem. The current episode started in the past 7 days. The problem has been gradually worsening. The cough is Productive of purulent sputum. Associated symptoms include nasal congestion, rhinorrhea and shortness of breath. Pertinent negatives include no chest pain, chills, ear pain, fever, headaches, hemoptysis, myalgias, rash, sore throat or wheezing. The symptoms are aggravated by lying down. She has tried OTC cough suppressant for the symptoms. The treatment provided no relief.     Past Medical History:   Diagnosis Date    Chickenpox        No past surgical history on file.    No family history on file.    Allergies   Allergen Reactions    Almitrine      Break out    Amoxicillin      rashes    Bactrim Ds      Breaking out      Iodine Contrast [Diagnostic X-Ray Materials] Hives     Pt. Had a CTA Head today w/ contrast and developed hives following injection. 11/4/23    Keflex      Rashes      Penicillins      rashes    Sulfa Drugs      rashes    Zithromax [Azithromycin-Fd&C Blue #2 [place]]      Breaking out         Medications, Allergies, and current problem list reviewed today in Epic    Review of Systems   Constitutional:  Positive for malaise/fatigue. Negative for chills and fever.   HENT:  Positive for congestion and rhinorrhea. Negative for ear pain, sinus pain and sore throat.    Respiratory:  Positive for cough, sputum production and shortness of breath. Negative for hemoptysis and wheezing.    Cardiovascular:  Negative for chest pain and leg swelling.   Gastrointestinal:  Negative for abdominal pain, diarrhea, nausea and vomiting.   Musculoskeletal:  Negative for myalgias.   Skin:  Negative for rash.   Neurological:  Negative for headaches.     All other systems reviewed and are negative.            Objective     /82   Pulse 98   Temp  "37.1 °C (98.8 °F) (Temporal)   Resp 14   Ht 1.499 m (4' 11\")   Wt 49.4 kg (109 lb)   SpO2 98%   BMI 22.02 kg/m²      Physical Exam  Constitutional:       General: She is not in acute distress.     Appearance: She is not ill-appearing.   HENT:      Head: Normocephalic and atraumatic.      Right Ear: Tympanic membrane, ear canal and external ear normal.      Left Ear: Tympanic membrane, ear canal and external ear normal.      Nose: Congestion and rhinorrhea present.      Mouth/Throat:      Mouth: Mucous membranes are moist.      Pharynx: No posterior oropharyngeal erythema.   Eyes:      Conjunctiva/sclera: Conjunctivae normal.   Cardiovascular:      Rate and Rhythm: Normal rate and regular rhythm.      Heart sounds: Normal heart sounds. No murmur heard.  Pulmonary:      Effort: Pulmonary effort is normal. No respiratory distress.      Breath sounds: Normal breath sounds. No wheezing, rhonchi or rales.      Comments: Spasmodic cough  Skin:     General: Skin is warm and dry.      Findings: No rash.   Neurological:      General: No focal deficit present.      Mental Status: She is alert and oriented to person, place, and time.   Psychiatric:         Mood and Affect: Mood normal.         Behavior: Behavior normal.         Thought Content: Thought content normal.         Judgment: Judgment normal.                             Assessment & Plan        1. LRTI (lower respiratory tract infection)    - doxycycline (VIBRAMYCIN) 100 MG Tab; Take 1 Tablet by mouth 2 times a day for 7 days.  Dispense: 14 Tablet; Refill: 0     Differential diagnoses, Supportive care, and indications for immediate follow-up discussed with patient.   Pathogenesis of diagnosis discussed including typical length and natural progression.   Instructed to return to clinic or nearest emergency department for any change in condition, further concerns, or worsening of symptoms.    The patient demonstrated a good understanding and agreed with the " treatment plan.    .Padmini Oleary P.A.-C.

## 2024-05-09 ENCOUNTER — HOSPITAL ENCOUNTER (OUTPATIENT)
Dept: RADIOLOGY | Facility: MEDICAL CENTER | Age: 64
End: 2024-05-09
Attending: NURSE PRACTITIONER
Payer: MEDICARE

## 2024-05-09 DIAGNOSIS — I67.1 INTRACRANIAL ANEURYSM: ICD-10-CM

## 2024-05-09 ASSESSMENT — ENCOUNTER SYMPTOMS
CHILLS: 0
WEIGHT LOSS: 0
WEAKNESS: 0
CARDIOVASCULAR NEGATIVE: 1
RESPIRATORY NEGATIVE: 1
FOCAL WEAKNESS: 0
DIAPHORESIS: 0
FEVER: 0
CONSTITUTIONAL NEGATIVE: 1
SENSORY CHANGE: 0
SPEECH CHANGE: 0
GASTROINTESTINAL NEGATIVE: 1

## 2024-05-09 ASSESSMENT — LIFESTYLE VARIABLES: SUBSTANCE_ABUSE: 0

## 2024-05-09 NOTE — CONSULTS
Telephone Appointment Visit   This telephone visit was initiated by the patient and they verbally consented. Cali Oseguera present for 16 minutes of total visit call time.    Reason for Call:   imaging follow up    Annie Kramer is a 63 y.o. female seen in clinic for review of surveillance imaging and discussion of potential intracranial aneurysm intervention.     Neurointerventional radiologist: Cali Oseguera MD  PCP: Penelope Mcfarland MD   Neurosurgeon/ referring: Kiesha Dobbins MD  Urologist: Lamin Vance MD    History of Present Illness:  Initial consultation 11/16/23:  presents with an incidentally found unruptured left ICA aneurysm. She initially presented for evaluation for aneurysm due to twin sister having an aneurysm, along with FMD, was noted on an otherwise negative CTA. She has a history of contrast reaction with hives, and sleep apnea. She has been referred to the Neuro Interventional Service for evaluation and management of this finding.     She is seen today for review of imaging studies and discussion of possible intracranial aneurysm repair. Today, the patient reports a history of occipital headaches from a prior accident that resolved after focal injections with her PCP. She denies sentinel headache and focal neurologic weakness. She reports visual changes after running. There is a family history of unruptured intracranial aneurysm in her sister, dementia in her mother. She states she is an overall anxious person but is not particularly worried about the presence of the aneurysm. She had an aneurysm evaluation in [place], where they recommended observation. Blood pressure is low without medications. There is no history of smoking. She denies current or past methamphetamine use. The patient has had anesthesia in the past and reports no problems or aftereffects. The patient has never taken DAPT in the past and reports microscopic hematuria of unknown etiology. She denies any  history of major bleeding, including epistaxis, hemoptysis, hematemesis, and melena. 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 She is unaccompanied by a support person to today's consultation. She takes care of her  at home, who has had a recent illness and is unwell.    Interval history 5/9/24: Ms. Kramer elected surveillance of her aneurysm after her initial consultation. An MRA was performed that shows no change in the aneurysm, however there is a small point at the superior portion of the aneurysm sac and this team has revised the recommendation from surveillance to intervention. She presents for review of imaging and discussion of potential intervention. Today, she denies symptoms from the aneurysm. She states she has stomach pain after taking aspirin, even coated aspirin. She states she is managing her modifiable risk factors. She is taking care of her  at home, who was recently hospitalized for an infection in his arm from a port, and has no one to take care of her if she undergoes a procedure.     Family history of aneurysm: yes, twin sister  Hyperlipidemia: no   Hypertension: no  Smoking: never    Past Medical History:   Diagnosis Date    Chickenpox      No past surgical history on file.  Social History     Socioeconomic History    Marital status:      Spouse name: Not on file    Number of children: Not on file    Years of education: Not on file    Highest education level: Not on file   Occupational History    Not on file   Tobacco Use    Smoking status: Never    Smokeless tobacco: Never   Vaping Use    Vaping Use: Never used   Substance and Sexual Activity    Alcohol use: Yes     Comment: occ    Drug use: Not Currently    Sexual activity: Not on file   Other Topics Concern    Not on file   Social History Narrative    Not on file     Social Determinants of Health     Financial Resource Strain: Not on file   Food Insecurity: Not on file    Transportation Needs: Not on file   Physical Activity: Not on file   Stress: Not on file   Social Connections: Not on file   Intimate Partner Violence: Not on file   Housing Stability: Not on file     No family history on file.    Review of Systems   Constitutional: Negative.  Negative for chills, diaphoresis, fever, malaise/fatigue and weight loss.   Respiratory: Negative.     Cardiovascular: Negative.    Gastrointestinal: Negative.    Genitourinary:  Positive for hematuria.   Skin: Negative.    Neurological:  Negative for sensory change, speech change, focal weakness and weakness.   Psychiatric/Behavioral:  Negative for substance abuse.      A comprehensive 14-point review of systems was negative except as described above.     Labs:    Latest Reference Range & Units 02/27/24 19:31   RBC 4.19 - 5.21 x10e6/uL 4.37 (E)   Hemoglobin 12.3 - 16.0 g/dL 13.4 (E)   Hematocrit 36.0 - 47.0 % 40.5 (E)   MCV 81.0 - 99.0 fL 92.7 (E)   MCH 28.0 - 33.8 pg 30.7 (E)   MCHC 33.1 - 36.5 g/dL 33.2 (E)   RDW 11.8 - 14.0 % 14.4 (H) (E)   Platelet Count 146 - 390 x10E3/uL 190 (E)   MPV 6.4 - 10.2 fL 9.9 (E)   Neutrophils-Polys 41.7 - 82.3 % 65.5 (E)   Neutrophils (Absolute) 1.40 - 8.00 x10E3/uL 4.40 (E)   Lymphocytes 10.8 - 44.4 % 26.2 (E)   Lymphs (Absolute) 1.00 - 5.20 x10E3/uL 1.80 (E)   Monocytes 5.0 - 12.8 % 6.4 (E)   Monos (Absolute) 0.16 - 1.00 x10E3/uL 0.40 (E)   Eosinophils 0.0 - 6.6 % 1.0 (E)   Eos (Absolute) 0.00 - 0.80 x10E3/uL 0.10 (E)   Basophils 0.0 - 1.3 % 0.9 (E)   Baso (Absolute) 0.00 - 0.30 x10E3/uL 0.10 (E)      Latest Reference Range & Units 08/04/23 15:01 10/18/23 05:18 11/03/23 12:36   Sodium 135 - 145 mmol/L 136 (E)  137   Potassium 3.6 - 5.5 mmol/L 3.6 (E)  4.1   Chloride 96 - 112 mmol/L 104 (E)  103   Co2 20 - 33 mmol/L 24 (E)  23   Anion Gap 7.0 - 16.0  8 (E)  11.0   Glucose 65 - 99 mg/dL 95 (E)  80   Bun 8 - 22 mg/dL 13 (E)  22   Creatinine 0.50 - 1.40 mg/dL 0.78 (E)  0.76   GFR (CKD-EPI) >60 mL/min/1.73 m 2    87   Calcium 8.5 - 10.5 mg/dL 8.8 (E)  9.1   AST(SGOT) 15 - 41 U/L 16 (E)     ALT(SGPT) 14 - 54 U/L 13 (L) (E)     Alkaline Phosphatase 38 - 126 U/L 61 (E)     Total Bilirubin 0.4 - 2.0 mg/dL 0.4 (E)     Total Protein 6.4 - 8.2 g/dL 7.2 (E)     Globulin 2.6 - 3.1 g/dL 3.2 (H) (E)     Glycohemoglobin 4.8 - 5.6 %  5.9 (H) (E)    Glom Filt Rate, Est >60 mL/min/1.7 74 (E)     (L): Data is abnormally low  (H): Data is abnormally high  (E): External lab result    Radiology:   MRA head 4/28/24 at [facility]:  1.  7 x 6 mm left supraclinoid fusiform aneurysm unchanged from previous exam.     2.  Hypoplasia of the left A1 segment.      CTA Head 11/4/23 at [facility]:  1.  6.5 x 5.5 mm distal left ICA paraophthalmic aneurysm. Consider neuro interventional radiology consultation.  2.  No other intracranial aneurysm or high flow vascular malformation.  3.  No large vessel occlusion or hemodynamically significant stenosis.  4.  Beaded bilateral distal vertebral arteries raises suspicion for fibromuscular dysplasia.  5.  No acute intracranial abnormality.        Current Outpatient Medications   Medication Sig Dispense Refill    doxycycline (VIBRAMYCIN) 100 MG Tab Take 1 Tablet by mouth 2 times a day for 7 days. 14 Tablet 0    Cholecalciferol 75 MCG (3000 UT) Tab Take 3,000 Units by mouth every day.      cyanocobalamin (VITAMIN B12) 1000 MCG Tab Take 5,000 mcg by mouth every day.      Magnesium Oxide 250 MG Tab Take 750 mg by mouth every day.      Omega-3 300 MG Cap Take 4 Capsules by mouth every day.      Multiple Vitamins-Minerals (ZINC PO) Take  by mouth.      Cyanocobalamin (VITAMIN B 12 PO) Take  by mouth.      Coenzyme Q10 (COQ10 PO) Take  by mouth.      levothyroxine (SYNTHROID) 75 MCG Tab Take 75 mcg by mouth every day.      levoFLOXacin (LEVAQUIN) 750 MG tablet Take 750 mg by mouth every day.      sertraline (ZOLOFT) 50 MG Tab Take 25 mg by mouth every day.      vitamin D (CHOLECALCIFEROL) 1000 Unit (25 mcg) Tab Take 1,000  Units by mouth every day.      Biotin 1 MG Cap Take  by mouth.       No current facility-administered medications for this encounter.       Allergies   Allergen Reactions    Almitrine      Break out    Amoxicillin      rashes    Bactrim Ds      Breaking out      Iodine Contrast [Diagnostic X-Ray [employer]] Hives     Pt. Had a CTA Head today w/ contrast and developed hives following injection. 11/4/23    Keflex      Rashes      Penicillins      rashes    Sulfa Drugs      rashes    Zithromax [Azithromycin-Fd&C Blue #2 [place]]      Breaking out         Physical Exam  Constitutional:       General: She is not in acute distress.     Appearance: She is not diaphoretic.   HENT:      Head: Normocephalic.   Eyes:      General: No scleral icterus.  Pulmonary:      Effort: Pulmonary effort is normal. No respiratory distress.   Abdominal:      General: There is no distension.   Skin:     General: Skin is warm and dry.      Coloration: Skin is not pale.      Findings: No erythema or rash.   Neurological:      General: No focal deficit present.      Mental Status: She is alert and oriented to person, place, and time. She is not disoriented.      Cranial Nerves: No cranial nerve deficit or facial asymmetry.      Sensory: Sensation is intact.      Motor: No weakness or tremor.      Coordination: Coordination normal.      Gait: Gait is intact. Gait normal.   Psychiatric:         Mood and Affect: Mood and affect normal.         Behavior: Behavior normal.         Thought Content: Thought content normal.         Cognition and Memory: Memory normal.         Judgment: Judgment normal.     PHASES Aneurysm Risk Score: 3    The 5-year absolute risk of rupture based on score is currently estimated to be:    ?2 points: 0.4%  3 points: 0.7%  4 points: 0.9%  5 points: 1.3%  6 points: 1.7%  7 points: 2.4%  8 points: 3.2%  9 points: 4.3%  10 points: 5.3%  11 points: 7.2%  ? 12 points: 17.8%    Impression:   1. Unruptured left  paraophthalmic internal carotid artery aneurysm 7-8 mm, amenable to flow diverter placement.  2. Fibromuscular dysplasia.  3. Contrast allergy.  4. Obstructive sleep apnea.  5. Claustrophobia with MRI.    Plan:   Cali Oseguera MD has reviewed 's history and imaging studies, examined the patient, and discussed treatment options.  has an intracranial aneurysm that is amenable to intervention with a flow diverter and our recommendation is for intervention based on the size and contour of the aneurysm. It measures slightly greater than 7 mm with this imaging modality, which changes her PHASES score to 3, carrying a 5 year rupture risk of 0.7%. She expressed hesitancy in proceeding with treatment due to managing her 's illness, and cites concern about DAPT with taking aspirin due to GI upset with this medication. She also has a history of gross hematuria and we would need to coordinate care with her urologist if we need to place her on antiplatelet agents. Overall interventional procedure risk is approximately 3%. After discussion of risks/benefits/ alternatives, she requests surveillance with a MRA in 6 months. She may contact us at any time with additional questions or concerns.    Isamar Orta, TEODORO with Cali Oseguera MD  Neuro Interventional Service   [id number] [facility] ([id number])  [place], [place] [address] [phone]    IBlaine, the interventional neuroradiologist attest that I personally reviewed the relevant imaging studies (MRI, CT and Angiograms) of this patient and correlated with the APNP's assessment and agree with the above note. I also personally discussed the findings and plan of management and the procedure with the patient.

## 2024-05-21 NOTE — ADDENDUM NOTE
Encounter addended by: Blaine Oseguera M.D. on: 5/21/2024 10:18 AM   Actions taken: Clinical Note Signed

## 2024-05-29 ENCOUNTER — TELEPHONE (OUTPATIENT)
Dept: SLEEP MEDICINE | Facility: MEDICAL CENTER | Age: 64
End: 2024-05-29
Payer: MEDICARE

## 2024-05-30 ENCOUNTER — OFFICE VISIT (OUTPATIENT)
Dept: SLEEP MEDICINE | Facility: MEDICAL CENTER | Age: 64
End: 2024-05-30
Attending: PHYSICIAN ASSISTANT
Payer: MEDICARE

## 2024-05-30 VITALS
BODY MASS INDEX: 22.18 KG/M2 | OXYGEN SATURATION: 96 % | DIASTOLIC BLOOD PRESSURE: 78 MMHG | WEIGHT: 110 LBS | SYSTOLIC BLOOD PRESSURE: 116 MMHG | RESPIRATION RATE: 16 BRPM | HEIGHT: 59 IN | HEART RATE: 68 BPM

## 2024-05-30 DIAGNOSIS — G47.33 OSA (OBSTRUCTIVE SLEEP APNEA): ICD-10-CM

## 2024-05-30 PROCEDURE — 99213 OFFICE O/P EST LOW 20 MIN: CPT | Performed by: PHYSICIAN ASSISTANT

## 2024-05-30 PROCEDURE — 99212 OFFICE O/P EST SF 10 MIN: CPT | Performed by: PHYSICIAN ASSISTANT

## 2024-05-30 PROCEDURE — 3074F SYST BP LT 130 MM HG: CPT | Performed by: PHYSICIAN ASSISTANT

## 2024-05-30 PROCEDURE — 3078F DIAST BP <80 MM HG: CPT | Performed by: PHYSICIAN ASSISTANT

## 2024-05-30 ASSESSMENT — ENCOUNTER SYMPTOMS
SPUTUM PRODUCTION: 1
ABDOMINAL PAIN: 1
SINUS PAIN: 0
SHORTNESS OF BREATH: 0
TREMORS: 0
ORTHOPNEA: 0
WEIGHT LOSS: 1
HEARTBURN: 1
INSOMNIA: 1
CHILLS: 0
FEVER: 0
COUGH: 0
CONSTIPATION: 1
DIZZINESS: 1
WHEEZING: 0
PALPITATIONS: 1
HEADACHES: 0
SORE THROAT: 0

## 2024-05-30 ASSESSMENT — FIBROSIS 4 INDEX: FIB4 SCORE: 1.52

## 2024-05-30 NOTE — PROGRESS NOTES
"Chief Complaint   Patient presents with    Apnea     Last Office Visit 5/23/23 with KIMO Larios    Settings:  auto CPAP 4 to 8 cm/H2O       HPI:  Annie Kramer is a 63 y.o. year old female here today for follow-up on {diagnosis:77874}.    Past Medical History: ***    Vitals:  /78 (BP Location: Left arm, Patient Position: Sitting, BP Cuff Size: Adult)   Pulse 68   Resp 16   Ht 1.499 m (4' 11\")   Wt 49.9 kg (110 lb)   SpO2 96%     Recent Imaging: ***    Currently using  {brand:35472} {modes:48679} @ ***cm H20 pressure; compliance reviewed for ***, days used ***, average daily usage ***, ***% of days greater than or equal to 4 hours, mask leak at  *** LPM at 95th percentile, AHI *** per hour.    Device obtained ***   DME provider {DME:91684}  Mask interface ***   Polysomnogram ***       Sleep schedule {schedule:87150}  Symptoms {symptoms:80733}    [place] Sleepiness Scale No data recorded   Stop Bang Score No data recorded         Review of Systems   Constitutional:  Positive for malaise/fatigue (mild to moderate) and weight loss (20 lbs weight loss, sweats lots). Negative for chills and fever.   HENT:  Positive for congestion (dry, allergies), hearing loss and tinnitus (intermittent). Negative for nosebleeds, sinus pain and sore throat.    Eyes:         Presc glasses    Respiratory:  Positive for sputum production (bronchitis one month ago). Negative for cough, shortness of breath and wheezing.    Cardiovascular:  Positive for palpitations. Negative for chest pain, orthopnea and leg swelling.   Gastrointestinal:  Positive for abdominal pain, constipation and heartburn (omeprazole for stress).        No dentures, bridges upper, some swallowing issues with drinking fluids, some other with hard boiled eggs, has \"ring\"     Neurological:  Positive for dizziness (occasional). Negative for tremors and headaches.   Psychiatric/Behavioral:  The patient has insomnia.        Past Medical " History:   Diagnosis Date    Chickenpox        No past surgical history on file.    No family history on file.    Social History     Socioeconomic History    Marital status:      Spouse name: Not on file    Number of children: Not on file    Years of education: Not on file    Highest education level: Not on file   Occupational History    Not on file   Tobacco Use    Smoking status: Never    Smokeless tobacco: Never   Vaping Use    Vaping status: Never Used   Substance and Sexual Activity    Alcohol use: Yes     Comment: occ    Drug use: Not Currently    Sexual activity: Not on file   Other Topics Concern    Not on file   Social History Narrative    Not on file     Social Determinants of Health     Financial Resource Strain: Not on file   Food Insecurity: Not on file   Transportation Needs: Not on file   Physical Activity: Not on file   Stress: Not on file   Social Connections: Not on file   Intimate Partner Violence: Low Risk  (2/27/2024)    Received from [facility]    History of Abuse     Have you ever been afraid of, threatened, neglected, or abused by someone?: No   Housing Stability: Not on file       Allergies as of 05/30/2024 - Reviewed 05/30/2024   Allergen Reaction Noted    Linaclotide Rash 05/02/2024    Lobster Hives and Itching 12/27/2020    Almitrine  12/21/2020    Amoxicillin  12/21/2020    Bactrim ds  12/21/2020    Iodine contrast [diagnostic x-ray materials] Hives 11/04/2023    Keflex  12/21/2020    Penicillins  12/21/2020    Sulfa drugs  12/21/2020    Zithromax [azithromycin-fd&c blue #2 [place]]  12/21/2020    Amoxicillin-pot clavulanate Rash 12/27/2020    Ciprofloxacin Hives 02/20/2024    Sulfamethoxazole w-trimethoprim Rash 12/27/2020          Current medications as of today   Current Outpatient Medications   Medication Sig Dispense Refill    Cholecalciferol 75 MCG (3000 UT) Tab Take 3,000 Units by mouth every day.      cyanocobalamin (VITAMIN B12) 1000 MCG Tab Take  5,000 mcg by mouth every day.      Magnesium Oxide 250 MG Tab Take 750 mg by mouth every day.      Omega-3 300 MG Cap Take 4 Capsules by mouth every day.      Multiple Vitamins-Minerals (ZINC PO) Take  by mouth.      Cyanocobalamin (VITAMIN B 12 PO) Take  by mouth.      Coenzyme Q10 (COQ10 PO) Take  by mouth.      levothyroxine (SYNTHROID) 75 MCG Tab Take 75 mcg by mouth every day.      sertraline (ZOLOFT) 50 MG Tab Take 25 mg by mouth every day.      vitamin D (CHOLECALCIFEROL) 1000 Unit (25 mcg) Tab Take 1,000 Units by mouth every day.      Biotin 1 MG Cap Take  by mouth.      levoFLOXacin (LEVAQUIN) 750 MG tablet Take 750 mg by mouth every day. (Patient not taking: Reported on 5/30/2024)       No current facility-administered medications for this visit.         Physical Exam:   Gen:           Alert and oriented, No apparent distress. Mood and affect appropriate, normal interaction with examiner.   Hearing:     Grossly intact.  Nose:          Normal, no lesions or deformities.  Dentition:    Good dentition.   Oropharynx:   Tongue normal, posterior pharynx without erythema or exudate.  Mallampati Classification: ***  Neck:        Supple, trachea midline, no masses.  Respiratory Effort: No intercostal retractions or use of accessory muscles.   Gait and Station: Normal.  Digits and Nails: No clubbing, cyanosis, petechiae, or nodes.   Skin:        No rashes, lesions or ulcers noted.               Ext:           No cyanosis or edema.      Immunizations:  Flu:***  Pneumovax 23:***  Prevnar 13:***  PCV 20: ***  SARS CoV2 Vaccine: ***, ***    Assessment / Plan:  There are no diagnoses linked to this encounter.    {Reviewed compliance:52017}    Follow-up:   No follow-ups on file.    Please note that this dictation was created using voice recognition software. I have made every reasonable attempt to correct obvious errors, but it is possible there are errors of grammar and possibly content that I did not discover before  finalizing the note.

## 2024-05-30 NOTE — PATIENT INSTRUCTIONS
1-patient has been unable to use cpap due to non functioning device  2-will need retested, now medicare  3-last test also demonstrated hypoxia, reassess on follow up or as advised by testing   4-short term follow up to review test results   5-reviewed risks associated with untreated or under treated apnea

## 2024-05-31 ENCOUNTER — OFFICE VISIT (OUTPATIENT)
Dept: SURGERY | Facility: MEDICAL CENTER | Age: 64
End: 2024-05-31
Payer: MEDICARE

## 2024-05-31 VITALS
HEIGHT: 59 IN | OXYGEN SATURATION: 96 % | SYSTOLIC BLOOD PRESSURE: 124 MMHG | DIASTOLIC BLOOD PRESSURE: 84 MMHG | TEMPERATURE: 98.4 F | WEIGHT: 108.6 LBS | BODY MASS INDEX: 21.89 KG/M2 | HEART RATE: 65 BPM

## 2024-05-31 DIAGNOSIS — K21.9 GASTROESOPHAGEAL REFLUX DISEASE WITHOUT ESOPHAGITIS: ICD-10-CM

## 2024-05-31 DIAGNOSIS — N64.4 MASTALGIA: ICD-10-CM

## 2024-05-31 DIAGNOSIS — F41.1 GENERALIZED ANXIETY DISORDER: ICD-10-CM

## 2024-05-31 DIAGNOSIS — F32.5 MAJOR DEPRESSIVE DISORDER WITH SINGLE EPISODE, IN REMISSION (HCC): Chronic | ICD-10-CM

## 2024-05-31 DIAGNOSIS — K58.8 OTHER IRRITABLE BOWEL SYNDROME: Chronic | ICD-10-CM

## 2024-05-31 DIAGNOSIS — E03.8 OTHER SPECIFIED HYPOTHYROIDISM: Chronic | ICD-10-CM

## 2024-05-31 PROBLEM — G47.30 SLEEP APNEA: Status: ACTIVE | Noted: 2023-01-31

## 2024-05-31 ASSESSMENT — ENCOUNTER SYMPTOMS
PSYCHIATRIC NEGATIVE: 1
CARDIOVASCULAR NEGATIVE: 1
NEUROLOGICAL NEGATIVE: 1
GASTROINTESTINAL NEGATIVE: 1
RESPIRATORY NEGATIVE: 1
EYES NEGATIVE: 1
CONSTITUTIONAL NEGATIVE: 1

## 2024-05-31 ASSESSMENT — FIBROSIS 4 INDEX: FIB4 SCORE: 1.52

## 2024-05-31 NOTE — PROGRESS NOTES
Subjective:   5/31/2024  7:49 AM  Primary care provider:  Penelope Mcfarland M.D.  Gynecologist:  TEODORO Diaz  Imaging facility:  [facility]    Chief Complaint:   Chief Complaint   Patient presents with    New Patient     Bilat. breast pain.       History of presenting illness:  This pleasant 63 y.o. year old female is kindly referred for consultation regarding intermittent bilateral breast pain with benign breast imaging.  She arrived later than requested, then because she left the paperwork in her car, required additional time to complete; therefore, the appointment started 25 min late.  She reports that her symptoms are more of a discomfort than pain for over five years. She has lost over 10 pounds since last November as her  has been sick in the hospital. She reports stress will often cause more of the pain and also touching it lightly will cause a lot of pain. She has not gotten a new bra since her weight loss. She has been too busy caring for her . She reports she stopped taking medication for anxiety and depression, but self-reports a high level of anxiety.    She reports her twin sister who lives in [place] is recently diagnosed with breast cancer. Patient is currently undergoing genetic testing through [facility].       LABS:  Lab Results   Component Value Date/Time    HBA1C 5.9 (H) 10/18/2023 05:18 AM          IMAGING:    MA-DIAGNOSTIC MAMMO BILAT W/TOMOSYNTHESIS W/CAD 09/22/2023    Narrative  EXAM: Bilateral diagnostic mammograms and bilateral breast ultrasound exams    TECHNIQUE: Tomosynthesis was performed with 1 mm slices through each breast in  both CC and MLO projections. Reconstructed 2-D bilateral CC and MLO views were  provided. Computer aided detection with the R2 image  system was utilized  on all images.    HISTORY: Bilateral lateral intermittent breast pain.    COMPARISON: November 4, 2022, October 26, 2021    FINDINGS:  MAMMOGRAM:  The breast tissue is heterogenously  dense, which may obscure small  masses. No suspicious masses or calcifications are seen within either breast.    Bilateral breast ultrasound exams-ultrasound exam of the outer half of each  breast does not reveal any discrete cystic or solid mass.    Impression  No mammographic evidence of malignancy in either breast. This  findings were discussed with the patient.    BI-RADS 1: Negative.    RECOMMENDATION: Routine annual screening mammography. Patient was given a pain  worksheet to help manage her breast pain. Results and recommendations were  discussed with the patient.    Electronically Signed by: Josefa Zhu MD 9/22/2023 3:19 PM          Allergies   Allergen Reactions    Linaclotide Rash    Lobster Hives and Itching     No shrimp either    Almitrine      Break out    Amoxicillin      rashes    Bactrim Ds      Breaking out      Iodine Contrast [Diagnostic X-Ray Materials] Hives     Pt. Had a CTA Head today w/ contrast and developed hives following injection. 11/4/23    Keflex      Rashes      Penicillins      rashes    Sulfa Drugs      rashes    Zithromax [Azithromycin-Fd&C Blue #2 [place]]      Breaking out      Amoxicillin-Pot Clavulanate Rash    Ciprofloxacin Hives    Sulfamethoxazole W-Trimethoprim Rash       Current Outpatient Medications   Medication Sig    cyanocobalamin (VITAMIN B12) 1000 MCG Tab Take 5,000 mcg by mouth every day.    Magnesium Oxide 250 MG Tab Take 750 mg by mouth every day.    Omega-3 300 MG Cap Take 4 Capsules by mouth every day.    Multiple Vitamins-Minerals (ZINC PO) Take  by mouth.    levothyroxine (SYNTHROID) 75 MCG Tab Take 75 mcg by mouth every day.    vitamin D (CHOLECALCIFEROL) 1000 Unit (25 mcg) Tab Take 1,000 Units by mouth every day.    Biotin 1 MG Cap Take  by mouth.    Cholecalciferol 75 MCG (3000 UT) Tab Take 3,000 Units by mouth every day. (Patient not taking: Reported on 5/31/2024)    Cyanocobalamin (VITAMIN B 12 PO) Take  by mouth. (Patient not taking: Reported on  2024)    Coenzyme Q10 (COQ10 PO) Take  by mouth. (Patient not taking: Reported on 2024)    levoFLOXacin (LEVAQUIN) 750 MG tablet Take 750 mg by mouth every day. (Patient not taking: Reported on 2024)    sertraline (ZOLOFT) 50 MG Tab Take 25 mg by mouth every day. (Patient not taking: Reported on 2024)       Patient Active Problem List   Diagnosis    Sleep apnea    Gastroesophageal reflux disease without esophagitis    Generalized anxiety disorder    Major depressive disorder with single episode, in remission ([facility])    Other specified hypothyroidism    Other irritable bowel syndrome    Mastalgia     Past Surgical History:   Procedure Laterality Date    ABDOMINAL HYSTERECTOMY TOTAL      Partial    CATARACT EXTRACTION WITH IOL      COLONOSCOPY      OOPHORECTOMY      OTHER      Nasal surgery    TONSILLECTOMY      Partial       Social History     Tobacco Use    Smoking status: Never    Smokeless tobacco: Never   Vaping Use    Vaping status: Never Used   Substance Use Topics    Alcohol use: Yes     Comment: occ    Drug use: Not Currently     Family and Occupational History     Socioeconomic History    Marital status:      Spouse name: Not on file    Number of children: Not on file    Years of education: Not on file    Highest education level: Not on file   Occupational History    Not on file      OB History    Para Term  AB Living   1 1 0 0 0 0   SAB IAB Ectopic Molar Multiple Live Births   0 0 0 0 0 0   Obstetric Comments   Age of first delivery: 36   Menarche: 10   LMP at 37, no HRT       Family History   Problem Relation Age of Onset    Breast Cancer Sister        Review of Systems   Constitutional: Negative.    HENT: Negative.     Eyes: Negative.    Respiratory: Negative.     Cardiovascular: Negative.    Gastrointestinal: Negative.    Genitourinary: Negative.    Musculoskeletal:         Complains of breast pain   Skin: Negative.    Neurological: Negative.    Endo/Heme/Allergies:  "Negative.    Psychiatric/Behavioral: Negative.          Objective:   /84 (BP Location: Left arm, Patient Position: Sitting, BP Cuff Size: Large adult)   Pulse 65   Temp 36.9 °C (98.4 °F) (Temporal)   Ht 1.499 m (4' 11\")   Wt 49.3 kg (108 lb 9.6 oz)   SpO2 96%   BMI 21.93 kg/m²       Physical Exam  Constitutional:       Appearance: Normal appearance.   Cardiovascular:      Rate and Rhythm: Regular rhythm.      Heart sounds: Normal heart sounds.   Pulmonary:      Effort: Pulmonary effort is normal.      Breath sounds: Normal breath sounds.   Skin:     Comments: See scanned breast diagram   Neurological:      Mental Status: She is alert.   Psychiatric:         Mood and Affect: Mood is anxious.           1. Gastroesophageal reflux disease without esophagitis        2. Generalized anxiety disorder        3. Major depressive disorder with single episode, in remission ([facility])        4. Other specified hypothyroidism        5. Other irritable bowel syndrome        6. Mastalgia            Medical Decision Making:  Today's Assessment / Status / Plan:     ASSESSMENT:  Intermittent bilateral breast pain for 5 years, worsened with stress and typically manifests with palpation, with benign bilateral mammograms and US.  No suspicious finding on exam or imaging.      Last bilateral mammogram and bilateral US 23 [facility]:  Heterogen dense.  Last prior mammo .     FH: Sister - breast cancer (65 years old), No AJ heritage, pending genetic testing results via [facility].      Menopausal/HRT status:      Age of first delivery: 36  Menarche: 10  LMP at 37, no HRT s/p hysterectomy and bilateral salpingo-oophorectomy    LIFESTYLE:  Never smoked, vaped or recreational drug use. Alcohol consumption occasionally.    ECOG 0= Fully active, able to carry on all pre-disease performance without restriction.     DISCUSSED/PLAN:  I personally reviewed and independently interpreted her available breast imaging " including mammograms, US, and relevant outside records.  I explained that without suspicious findings on exam or imaging, breast pain is not by itself necessarily indicative of an underlying cancer.  I did strongly advise her to continue monitoring her breasts for physical changes that persist or worsen, however, as pain sometimes can lead to discovery of abnormalities.  I emphasized that while there may be no suspicious findings currently, things can certainly develop over time, so she should remain vigilant.     With regard to symptom management, I advised that she undergo formal bra measurement at a reliable establishment to see if a better fitting, more supportive bra, could provide relief. She may be experiencing stretching of Sonido's ligaments, and she notes that her bras are too big for her now.   I also suggested topical analgesics such as Aspercreme or diclofenac as needed.      At this point, it would be reasonable to resume annual mammograms and clinical breast exams with her PCP/GYN.  I emphasized the need for her to continue monitoring her breasts for any changes including breast lump, nipple discharge, swelling, redness, skin dimpling, or other changes, and to seek medical evaluation promptly.  We will be happy to see her back if there are any concerning changes.      Total time spent today, including personal review and independent interpretation of available breast imaging, outside records, face to face time, chart documentation, and , was 40 minutes      Tayler Martínez PA-C present at time of visit.

## 2024-06-06 LAB
APOB+LDLR+PCSK9 GENE MUT ANL BLD/T: NOT DETECTED
BRCA1+BRCA2 DEL+DUP + FULL MUT ANL BLD/T: NOT DETECTED
MLH1+MSH2+MSH6+PMS2 GN DEL+DUP+FUL M: NOT DETECTED

## 2024-07-19 ENCOUNTER — APPOINTMENT (OUTPATIENT)
Dept: SLEEP MEDICINE | Facility: MEDICAL CENTER | Age: 64
End: 2024-07-19
Attending: PHYSICIAN ASSISTANT
Payer: MEDICARE

## 2024-07-26 ENCOUNTER — SLEEP STUDY (OUTPATIENT)
Dept: SLEEP MEDICINE | Facility: MEDICAL CENTER | Age: 64
End: 2024-07-26
Attending: PHYSICIAN ASSISTANT
Payer: MEDICARE

## 2024-07-26 DIAGNOSIS — G47.33 OSA (OBSTRUCTIVE SLEEP APNEA): ICD-10-CM

## 2024-07-26 PROCEDURE — 95810 POLYSOM 6/> YRS 4/> PARAM: CPT | Performed by: STUDENT IN AN ORGANIZED HEALTH CARE EDUCATION/TRAINING PROGRAM

## 2024-07-29 NOTE — PROCEDURES
Patient: SOLEDAD WILLIAMSON  ID: [id number] Date: 7/26/2024 Exam No.:   MONTAGE: Standard  STUDY TYPE: Diagnostic  RECORDING TECHNIQUE:   After the scalp was prepared, gold plated electrodes were applied to the scalp according to the International 10-20 System. EEG (electroencephalogram) was continuously monitored from the O1-M2, O2-M1, C3-M2, C4-M1, F3-M2, and F4-M1. EOGs (electrooculograms) were monitored by electrodes placed at the left and right outer canthi. Chin EMG (electromyogram) was monitored by electrodes placed on the mentalis and sub-mentalis muscles. Nasal and oral airflow were monitored using a triple port thermocouple as well as oronasal pressure transducer. Respiratory effort was measured by inductive plethysmography technology employing abdominal and thoracic belts. Blood oxygen saturation and pulse were monitored by pulse oximetry. Heart rhythm was monitored by surface electrocardiogram. Leg EMG was studied using surface electrodes placed on left and right anterior tibialis. A microphone was used to monitor tracheal sounds and snoring. Body position was monitored and documented by technician observation.   SCORING CRITERIA:   A modification of the AASM manual for scoring of sleep and associated events was used. Obstructive apneas were scored by cessation of airflow for at least 10 seconds with continuing respiratory effort. Central apneas were scored by cessation of airflow for at least 10 seconds with no respiratory effort. Hypopneas were scored by a 30% or more reduction in airflow for at least 10 seconds accompanied by arterial oxygen desaturation of 3% or an arousal. For CMS (Medicare) patients, per AASM rule 1B, hypopneas are scored by 30% with mild reduction in airflow for at least 10 seconds accompanied by arterial saturation decreased at 4%.  Study start time was 10:30:08 PM. Diagnostic recording time was 7h 13.0m with a total sleep time of 6h 47.5m resulting in a sleep efficiency of  94.11%%. Sleep latency from the start of the study was 01 minutes and the latency from sleep to REM was 40 minutes. In total,25 arousals were scored for an arousal index of 3.7.  Respiratory:  There were a total of 1 apneas consisting of 0 obstructive apneas, 0 mixed apneas, and 1 central apneas. A total of 38 hypopneas were scored. The apnea index was 0.15 per hour and the hypopnea index was 5.60 per hour resulting in an overall AHI of 5.74. AHI during REM was 18.6 and AHI while supine was 4.53.  Oximetry:  There was a mean oxygen saturation of 92.0%. The minimum oxygen saturation in NREM was 86.0 % and in REM was 79.0%. The patient spent 22.0 minutes of TST with SaO2 <88%.  Cardiac:  The highest heart rate seen while awake was 80 BPM while the highest heart rate during sleep was 75 BPM with an average sleeping heart rate of 49 BPM.  Limb Movements:  There were a total of 0 PLMs during sleep which resulted in a PLMS index of 0.0. Of these, 0 were associated with arousals which resulted in a PLMS arousal index of 0.0.  Assessment:   ***Obstructive Sleep Apnea Hypopnea - AHI*** ***Nocturnal desaturation - shakeel saturation ***% - saturations <88% below for *** minutes of TST.  Recommendation:    cardiovascular and neurovascular disease.

## 2024-08-09 ENCOUNTER — OFFICE VISIT (OUTPATIENT)
Dept: SLEEP MEDICINE | Facility: MEDICAL CENTER | Age: 64
End: 2024-08-09
Attending: PHYSICIAN ASSISTANT
Payer: MEDICARE

## 2024-08-09 VITALS
HEIGHT: 59 IN | HEART RATE: 58 BPM | SYSTOLIC BLOOD PRESSURE: 110 MMHG | RESPIRATION RATE: 14 BRPM | OXYGEN SATURATION: 98 % | BODY MASS INDEX: 22.42 KG/M2 | DIASTOLIC BLOOD PRESSURE: 72 MMHG | WEIGHT: 111.2 LBS

## 2024-08-09 DIAGNOSIS — G47.33 OSA (OBSTRUCTIVE SLEEP APNEA): ICD-10-CM

## 2024-08-09 PROCEDURE — 99213 OFFICE O/P EST LOW 20 MIN: CPT | Performed by: PHYSICIAN ASSISTANT

## 2024-08-09 PROCEDURE — 3078F DIAST BP <80 MM HG: CPT | Performed by: PHYSICIAN ASSISTANT

## 2024-08-09 PROCEDURE — 3074F SYST BP LT 130 MM HG: CPT | Performed by: PHYSICIAN ASSISTANT

## 2024-08-09 PROCEDURE — 99212 OFFICE O/P EST SF 10 MIN: CPT | Performed by: PHYSICIAN ASSISTANT

## 2024-08-09 ASSESSMENT — ENCOUNTER SYMPTOMS
ORTHOPNEA: 0
PALPITATIONS: 1
WEIGHT LOSS: 1
SINUS PAIN: 0
SORE THROAT: 0
SPUTUM PRODUCTION: 0
HEARTBURN: 1
INSOMNIA: 0
FEVER: 0
WHEEZING: 0
TREMORS: 0
DIZZINESS: 1
COUGH: 0
HEADACHES: 0
SHORTNESS OF BREATH: 0
CHILLS: 0

## 2024-08-09 ASSESSMENT — FIBROSIS 4 INDEX: FIB4 SCORE: 1.52

## 2024-08-09 NOTE — PROGRESS NOTES
"Chief Complaint   Patient presents with    Follow-Up     Last Office Visit 5/30/24 with Laura Gonzalez P.A.-C.    Sleep Study Complete on 7/26/24          HPI:  Annie Kramer is a 63 y.o. year old female here today for follow-up on sleep study results. Last seen in clinic 5/30/2024 by EVENS Medrano.  Previously evaluated by Dr. Ant Banegas on 1/31/2023.     Past Medical History: GERD, major depressive disorder, generalized anxiety disorder, hypothyroidism, IBS, brain aneurysm.  Former smoker.  Patient reports her elder  health is poor and this is a great stressor to her.  Also reports need to take better care of her own health.     Vitals:  /72 (BP Location: Left arm, Patient Position: Sitting, BP Cuff Size: Adult)   Pulse (!) 58   Resp 14   Ht 1.499 m (4' 11\")   Wt 50.4 kg (111 lb 3.2 oz)   SpO2 98%  BMI 22.46 kg/m2.     Recent Imaging: none    Polysomnogram obtained 7/26/2024 demonstrated overall AHI 5.74 increased to 18.6 events per hour during REM sleep.  Low oxygen saturation of 79%, spent 22 min with sat less than or equal to 88%.    Titration study vs auto cpap trial.      Previous overnight home sleep study obtained 4/11/2023 demonstrated moderate positional BRITNEY with pAHI of 21.8 events per hour and increased to 25.3 in supine position. pRDI 23.3.  Low oxygen sat of 82% with sats less than 88% for 44.6 min.     Sleep schedule goes to bed 11pm , wakens 6-8 am , and gets up during the night occasionally, will not sleep well if doesn't take melatonin gummy formula   Symptoms fatigue and mild HA    [place] Sleepiness Scale reported as 9/24 on 1/31/2023         Review of Systems   Constitutional:  Positive for weight loss (likely due to stress). Negative for chills, fever and malaise/fatigue.   HENT:  Positive for congestion and tinnitus (off and on). Negative for hearing loss, nosebleeds, sinus pain and sore throat.    Eyes:         Presc glasses   Respiratory:  Negative for " cough, sputum production, shortness of breath and wheezing.    Cardiovascular:  Positive for palpitations. Negative for chest pain, orthopnea and leg swelling.   Gastrointestinal:  Positive for heartburn (sees Gastroenterology).        Missing two teeth, no dentures, some difficulty swallowing    Neurological:  Positive for dizziness (with walking intermittently). Negative for tremors and headaches.   Psychiatric/Behavioral:  The patient does not have insomnia.        Past Medical History:   Diagnosis Date    Anxiety     Brain aneurysm     Chickenpox     Constipation     Depression     GERD (gastroesophageal reflux disease)     Glaucoma     Hypothyroidism     IBS (irritable bowel syndrome)     Irritable bowel syndrome     Sleep apnea        Past Surgical History:   Procedure Laterality Date    ABDOMINAL HYSTERECTOMY TOTAL      Partial    CATARACT EXTRACTION WITH IOL      COLONOSCOPY      OOPHORECTOMY      OTHER      Nasal surgery    TONSILLECTOMY      Partial       Family History   Problem Relation Age of Onset    Breast Cancer Sister        Social History     Socioeconomic History    Marital status:      Spouse name: Not on file    Number of children: Not on file    Years of education: Not on file    Highest education level: Not on file   Occupational History    Not on file   Tobacco Use    Smoking status: Never    Smokeless tobacco: Never   Vaping Use    Vaping status: Never Used   Substance and Sexual Activity    Alcohol use: Yes     Comment: occ    Drug use: Not Currently    Sexual activity: Not on file   Other Topics Concern    Not on file   Social History Narrative    Not on file     Social Determinants of Health     Financial Resource Strain: Not on file   Food Insecurity: Not on file   Transportation Needs: Not on file   Physical Activity: Not on file   Stress: Not on file   Social Connections: Not on file   Intimate Partner Violence: Low Risk  (2/27/2024)    [facility],  [facility]    History of Abuse     Have you ever been afraid of, threatened, neglected, or abused by someone?: No   Housing Stability: Not on file       Allergies as of 08/09/2024 - Reviewed 08/09/2024   Allergen Reaction Noted    Linaclotide Rash 05/02/2024    Lobster Hives and Itching 12/27/2020    Almitrine  12/21/2020    Amoxicillin  12/21/2020    Bactrim ds  12/21/2020    Iodine contrast [diagnostic x-ray materials] Hives 11/04/2023    Keflex  12/21/2020    Penicillins  12/21/2020    Sulfa drugs  12/21/2020    Zithromax [azithromycin-fd&c blue #2 [place]]  12/21/2020    Amoxicillin-pot clavulanate Rash 12/27/2020    Ciprofloxacin Hives 02/20/2024    Sulfamethoxazole w-trimethoprim Rash 12/27/2020          Current medications as of today   Current Outpatient Medications   Medication Sig Dispense Refill    cyanocobalamin (VITAMIN B12) 1000 MCG Tab Take 5,000 mcg by mouth every day.      Magnesium Oxide 250 MG Tab Take 750 mg by mouth every day.      Omega-3 300 MG Cap Take 4 Capsules by mouth every day.      Multiple Vitamins-Minerals (ZINC PO) Take  by mouth.      levothyroxine (SYNTHROID) 75 MCG Tab Take 75 mcg by mouth every day.      vitamin D (CHOLECALCIFEROL) 1000 Unit (25 mcg) Tab Take 1,000 Units by mouth every day.      Biotin 1 MG Cap Take  by mouth.       No current facility-administered medications for this visit.         Physical Exam:   Gen:           Alert and oriented, No apparent distress. Mood and affect appropriate, normal interaction with examiner.   Hearing:     Grossly intact.  Nose:          Normal, no lesions or deformities.  Dentition:    fair dentition.   Oropharynx:   Tongue normal, posterior pharynx without erythema or exudate.  Mallampati Classification: II  Neck:        Supple, trachea midline, no masses.  Respiratory Effort: No intercostal retractions or use of accessory muscles.   Gait and Station: Normal.  Digits and Nails: No clubbing, cyanosis, petechiae, or  nodes.   Skin:        No rashes, lesions or ulcers noted.               Ext:           No cyanosis or edema.      Immunizations:  Flu:recommended   PCV 20: recommended  Moderna SARS CoV2 Vaccine: 11/17/2021, 4/16/2021, 3/19/2021    Assessment / Plan:  1. BRITNEY (obstructive sleep apnea)  - DME CPAP    Reviewed sleep study results demonstrating mild sleep apnea and significant hypoxia which should resolve with treatment of her sleep apnea.  Will need overnight oximetry to assure her adequate nocturnal oxygen levels once stable on CPAP.  Send order for auto CPAP to [facility] in [place].  Patient understands to contact [facility] in [place] if no word in 2-3 weeks for status update.  Reviewed strategies for success including minimum usage requirements of 4 hours per night most nights in the first 90 days of having device.  Reviewed therapeutic goal of 6-6.5 hours per night.  Patient will need short-term follow-up to review first compliance and instructed to bring entire device to clinic for that first appointment.  Patient states understanding.        Follow-up:   Return in about 4 months (around 12/1/2024) for Return with Laura Gonzalez PA-C.    Please note that this dictation was created using voice recognition software. I have made every reasonable attempt to correct obvious errors, but it is possible there are errors of grammar and possibly content that I did not discover before finalizing the note.

## 2024-08-09 NOTE — PATIENT INSTRUCTIONS
1-reviewed sleep study results demonstrating mild sleep apnea and significant hypoxia which should resolve with treatment sleep apnea  2-will need overnight oximetry to assure adequate nocturnal oxygen levels once stable on cpap  3-send order for auto cpap to [facility] in [place]  4-if no word in 2-3 weeks, call them for update  5-strategies for success  First 2-3 days train up by using for 30 min to 2 hours  Minimum usage of 4 hours per night on most night in the first 90 of having device  Therapeutic goal is 6-6.5 hours per night  Transition to all night use   6-short term follow up and bring entire device for review

## 2024-08-14 ENCOUNTER — TELEPHONE (OUTPATIENT)
Dept: SLEEP MEDICINE | Facility: MEDICAL CENTER | Age: 64
End: 2024-08-14
Payer: MEDICARE

## 2024-08-14 NOTE — TELEPHONE ENCOUNTER
Patient LVM saying that she called [facility] [place] and they still don't have her order please send her order

## 2024-08-23 ENCOUNTER — RESEARCH ENCOUNTER (OUTPATIENT)
Dept: RESEARCH | Facility: MEDICAL CENTER | Age: 64
End: 2024-08-23
Payer: MEDICARE

## 2024-08-23 NOTE — RESEARCH NOTE
VIVIAN has sent Pt. results via her email as requested.  Pt. Now has an issue printing.  She sent me screen shot and it displayed the  results.  Not sure why she can't print.

## 2024-08-29 ENCOUNTER — TELEPHONE (OUTPATIENT)
Dept: SLEEP MEDICINE | Facility: MEDICAL CENTER | Age: 64
End: 2024-08-29
Payer: MEDICARE

## 2024-08-29 NOTE — TELEPHONE ENCOUNTER
Caller: Annie Kramer    Topic/issue: The pressure on the CPAP machine that pt has is too much. PT spoke with Gisela about symptoms she's experiencing and they said they can't change the pressure of it without the provider's order and adjustment number. She's having trouble sleeping and is having headaches because of it. She's also having issues with the mask provided. She is wondering if she can come in with the machine to so someone can change the pressure or if someone can send the order to [facility] so they can do it. She would like a call back at the number below.     Callback Number: [phone]

## 2024-09-23 ENCOUNTER — TELEPHONE (OUTPATIENT)
Dept: SLEEP MEDICINE | Facility: MEDICAL CENTER | Age: 64
End: 2024-09-23
Payer: MEDICARE

## 2024-10-23 ENCOUNTER — OFFICE VISIT (OUTPATIENT)
Dept: SLEEP MEDICINE | Facility: MEDICAL CENTER | Age: 64
End: 2024-10-23
Attending: PHYSICIAN ASSISTANT
Payer: MEDICARE

## 2024-10-23 VITALS
WEIGHT: 112.9 LBS | HEART RATE: 57 BPM | OXYGEN SATURATION: 98 % | HEIGHT: 58 IN | RESPIRATION RATE: 16 BRPM | DIASTOLIC BLOOD PRESSURE: 72 MMHG | BODY MASS INDEX: 23.7 KG/M2 | SYSTOLIC BLOOD PRESSURE: 122 MMHG

## 2024-10-23 DIAGNOSIS — G47.33 OSA (OBSTRUCTIVE SLEEP APNEA): ICD-10-CM

## 2024-10-23 PROCEDURE — 3074F SYST BP LT 130 MM HG: CPT | Performed by: PHYSICIAN ASSISTANT

## 2024-10-23 PROCEDURE — 99213 OFFICE O/P EST LOW 20 MIN: CPT | Performed by: PHYSICIAN ASSISTANT

## 2024-10-23 PROCEDURE — 3078F DIAST BP <80 MM HG: CPT | Performed by: PHYSICIAN ASSISTANT

## 2024-10-23 ASSESSMENT — FIBROSIS 4 INDEX: FIB4 SCORE: 1.54

## 2024-10-23 ASSESSMENT — ENCOUNTER SYMPTOMS
INSOMNIA: 1
DIZZINESS: 0
FEVER: 0
SHORTNESS OF BREATH: 0
CHILLS: 0
HEADACHES: 0
COUGH: 0
HEARTBURN: 1
TREMORS: 0
SPUTUM PRODUCTION: 0
WHEEZING: 0
SORE THROAT: 1
PALPITATIONS: 1
ORTHOPNEA: 0
SINUS PAIN: 0
WEIGHT LOSS: 1

## 2024-12-15 ENCOUNTER — HOSPITAL ENCOUNTER (OUTPATIENT)
Dept: RADIOLOGY | Facility: MEDICAL CENTER | Age: 64
End: 2024-12-15
Attending: NURSE PRACTITIONER
Payer: MEDICARE

## 2024-12-15 DIAGNOSIS — I67.1 NONRUPTURED CEREBRAL ANEURYSM: ICD-10-CM

## 2024-12-15 PROCEDURE — 70544 MR ANGIOGRAPHY HEAD W/O DYE: CPT

## 2025-02-20 ENCOUNTER — APPOINTMENT (OUTPATIENT)
Dept: RADIOLOGY | Facility: MEDICAL CENTER | Age: 65
End: 2025-02-20
Attending: NURSE PRACTITIONER
Payer: MEDICARE

## 2025-03-27 PROBLEM — D48.5 NEOPLASM OF UNCERTAIN BEHAVIOR OF SKIN: Status: ACTIVE | Noted: 2025-03-27

## 2025-03-27 PROBLEM — L30.8 OTHER SPECIFIED DERMATITIS: Status: ACTIVE | Noted: 2025-03-27

## 2025-03-27 PROBLEM — L81.4 OTHER MELANIN HYPERPIGMENTATION: Status: ACTIVE | Noted: 2025-03-27

## 2025-04-23 ENCOUNTER — TELEPHONE (OUTPATIENT)
Dept: SLEEP MEDICINE | Facility: MEDICAL CENTER | Age: 65
End: 2025-04-23
Payer: MEDICARE

## 2025-04-24 ENCOUNTER — APPOINTMENT (OUTPATIENT)
Dept: SLEEP MEDICINE | Facility: MEDICAL CENTER | Age: 65
End: 2025-04-24
Attending: PHYSICIAN ASSISTANT
Payer: MEDICARE

## 2025-04-28 ENCOUNTER — OFFICE VISIT (OUTPATIENT)
Dept: SLEEP MEDICINE | Facility: MEDICAL CENTER | Age: 65
End: 2025-04-28
Attending: PHYSICIAN ASSISTANT
Payer: MEDICARE

## 2025-04-28 VITALS
OXYGEN SATURATION: 95 % | SYSTOLIC BLOOD PRESSURE: 122 MMHG | WEIGHT: 114 LBS | DIASTOLIC BLOOD PRESSURE: 68 MMHG | HEART RATE: 68 BPM | RESPIRATION RATE: 16 BRPM | HEIGHT: 58 IN | BODY MASS INDEX: 23.93 KG/M2

## 2025-04-28 DIAGNOSIS — G47.33 OSA (OBSTRUCTIVE SLEEP APNEA): ICD-10-CM

## 2025-04-28 PROCEDURE — 99212 OFFICE O/P EST SF 10 MIN: CPT | Performed by: PHYSICIAN ASSISTANT

## 2025-04-28 ASSESSMENT — FIBROSIS 4 INDEX: FIB4 SCORE: 1.69

## 2025-04-28 ASSESSMENT — ENCOUNTER SYMPTOMS
COUGH: 0
DIZZINESS: 0
SHORTNESS OF BREATH: 0
FEVER: 0
SINUS PAIN: 0
CHILLS: 0
WHEEZING: 0
SPUTUM PRODUCTION: 0
HEADACHES: 1
SORE THROAT: 1
PALPITATIONS: 0
WEIGHT LOSS: 0
HEARTBURN: 1
ORTHOPNEA: 0
TREMORS: 0
INSOMNIA: 1

## 2025-04-28 NOTE — PATIENT INSTRUCTIONS
1-reports one cup of coffee per day and caffeine free tea  2-needs to use cpap for at least 6 hours per night every night  3-now has mouth tape, to use with cpap with nasal pillows   4-resume cpap use   5-As a reminder use distilled water only in humidifier chamber.  Fresh fill daily  6-Today we reviewed equipment cleaning  once weekly minimum  mask, tubing and water chamber  use dedicated container  use mild soap and water  SoClean or other ozone  are not recommended  white vinegar and water solution is no longer recommended  hang tubing to dry  mask sanitizing wipes are an option for use   7-Equipment replacement schedule : Nasal pillows 2 times per month, Head gear every 6 months, Tubing every 3 months, Ultra-fine filters 2 times per month, Humidifier chamber every 6 months

## 2025-04-28 NOTE — PROGRESS NOTES
"Chief Complaint   Patient presents with    Apnea     Last Office Visit 10/23/24 with Laura Gonzalez P.A.-C.    PAP/O2/OAT: Auto CPAP 5-15       HPI:  Annie Kramer is a 64 y.o. year old female here today for follow-up on obstructive sleep apnea.  Last seen in clinic 10/23/2024 by EVENS Medrano.  Patient previously evaluated by Dr. Ant Banegas on 1/31/2023.    Past Medical History: GERD, major depressive disorder, generalized anxiety disorder, hypothyroidism, IBS, brain aneurysm. Former smoker. Patient reports her elder  health is poor and this is a great stressor to her.     Vitals:  /68 (BP Location: Left arm, Patient Position: Sitting, BP Cuff Size: Adult)   Pulse 68   Resp 16   Ht 1.473 m (4' 10\")   Wt 51.7 kg (114 lb)   SpO2 95% BMI of 23.83 kg/m².    Recent Imaging: Chest x-ray obtained 3/28/2025 demonstrated no acute cardiopulmonary changes.    Currently using  Resmed auto CPAP @5-15 cm H20 pressure; patient has not consistently used device since last office visit.  She attributes this to travel to [place] as well as caring for her ailing .    Device obtained 8/27/2024  DME provider [facility] in [place]  Mask interface nasal pillows    Polysomnogram obtained 7/26/2024 demonstrated overall AHI 5.74 increased to 18.6 events per hour during REM sleep.  Low oxygen saturation of 79%, spent 22 min with sat less than or equal to 88%.  Recommendation for titration study vs auto cpap trial.       Previous overnight home sleep study obtained 4/11/2023 demonstrated moderate positional BRITNEY with pAHI of 21.8 events per hour and increased to 25.3 in supine position. pRDI 23.3.  Low oxygen sat of 82% with sats less than 88% for 44.6 min.       Sleep schedule goes to bed 10-11 p.m., wakens 6 AM , and gets up during the night around 2 AM and may have difficulty going back to sleep   Symptoms  reports some difficulty with oral dryness however if she uses consistently this resolves " however when she does not use consistently it is an issue.    [place] Sleepiness Scale reported as 9/24 on 1/31/2023    Review of Systems   Constitutional:  Negative for chills, fever, malaise/fatigue and weight loss.   HENT:  Positive for congestion, sore throat (when not using machine for awhile) and tinnitus (occasional). Negative for hearing loss, nosebleeds and sinus pain.    Eyes:         Presc glasses    Respiratory:  Negative for cough, sputum production, shortness of breath and wheezing.    Cardiovascular:  Positive for chest pain. Negative for palpitations, orthopnea and leg swelling.   Gastrointestinal:  Positive for heartburn.        Partial upper denture, two missing teeth, no swallowing issues    Neurological:  Positive for headaches (sinus headaches due to allergies). Negative for dizziness and tremors.   Psychiatric/Behavioral:  The patient has insomnia.        Past Medical History:   Diagnosis Date    Anxiety     Brain aneurysm     Chickenpox     Constipation     Depression     GERD (gastroesophageal reflux disease)     Glaucoma     Hypothyroidism     IBS (irritable bowel syndrome)     Irritable bowel syndrome     Sleep apnea        Past Surgical History:   Procedure Laterality Date    ABDOMINAL HYSTERECTOMY TOTAL      Partial    CATARACT EXTRACTION WITH IOL      COLONOSCOPY      OOPHORECTOMY      OTHER      Nasal surgery    TONSILLECTOMY      Partial       Family History   Problem Relation Age of Onset    Breast Cancer Sister        Social History     Socioeconomic History    Marital status:      Spouse name: Not on file    Number of children: Not on file    Years of education: Not on file    Highest education level: Not on file   Occupational History    Not on file   Tobacco Use    Smoking status: Never    Smokeless tobacco: Never   Vaping Use    Vaping status: Never Used   Substance and Sexual Activity    Alcohol use: Yes     Comment: occ    Drug use: Not Currently    Sexual activity: Not  on file   Other Topics Concern    Not on file   Social History Narrative    Not on file     Social Drivers of Health     Financial Resource Strain: Not on File (2024)    Received from [facility]    Financial Resource Strain     Financial Resource Strain: 0   Food Insecurity: Not on File (2024)    Received from [facility]    Food Insecurity     Food: 0   Transportation Needs: Not on File (2024)    Received from [facility]    Transportation Needs     Transportation: 0   Physical Activity: Not on File (2024)    Received from [facility]    Physical Activity     Physical Activity: 0   Stress: Not on File (2024)    Received from [facility]    Stress     Stress: 0   Social Connections: Not on File (2024)    Received from [facility]    Social Connections     Connectedness: 0   Intimate Partner Violence: Low Risk  (2024)    [facility]    History of Abuse     Have you ever been afraid of, threatened, neglected, or abused by someone?: No   Housing Stability: Not on File (2024)    Received from [facility]    Housing Stability     Housin       Allergies as of 2025 - Reviewed 2025   Allergen Reaction Noted    Linaclotide Rash 2024    Lobster Hives and Itching 2020    Almitrine  2020    Amoxicillin  2020    Bactrim ds  2020    Iodine contrast [diagnostic x-ray materials] Hives 2023    Keflex  2020    Penicillins  2020    Sulfa drugs  2020    Zithromax [azithromycin-fd&c blue #2 [place]]  2020    Amoxicillin-pot clavulanate Rash 2020    Ciprofloxacin Hives 2024    Hyoscyamine Anxiety 10/17/2024    Sulfamethoxazole w-trimethoprim Rash 2020          Current medications as of today   Current Outpatient Medications   Medication Sig Dispense Refill    Bacillus Coagulans-Inulin (PROBIOTIC) 1-250 BILLION-MG Cap as directed Orally      sennosides (SENOKOT) 8.6  MG Tab 1 tablet Orally Every other day      GARLIC PO Take 100 mg by mouth.      levothyroxine (SYNTHROID) 50 MCG Tab Take 1 Tablet by mouth every day.      cyanocobalamin (VITAMIN B12) 1000 MCG Tab Take 5,000 mcg by mouth every day.      Magnesium Oxide 250 MG Tab Take 750 mg by mouth every day.      Omega-3 300 MG Cap Take 4 Capsules by mouth every day.      Multiple Vitamins-Minerals (ZINC PO) Take  by mouth.      levothyroxine (SYNTHROID) 75 MCG Tab Take 75 mcg by mouth every day.      vitamin D (CHOLECALCIFEROL) 1000 Unit (25 mcg) Tab Take 1,000 Units by mouth every day.      Biotin 1 MG Cap Take  by mouth.       No current facility-administered medications for this visit.         Physical Exam:   Gen:           Alert and oriented, No apparent distress. Mood and affect appropriate, normal interaction with examiner.   Hearing:     Grossly intact.  Nose:          Normal, no lesions or deformities.  Dentition:    Fair dentition.   Oropharynx:   Tongue normal, posterior pharynx without erythema or exudate.  Mallampati Classification: II  Neck:        Supple, trachea midline, no masses.  Respiratory Effort: No intercostal retractions or use of accessory muscles.   Gait and Station: Normal.  Digits and Nails: No clubbing, cyanosis, petechiae, or nodes.   Skin:        No rashes, lesions or ulcers noted.               Ext:           No cyanosis or edema.      Immunizations:  Flu: Annual recommended  PCV 20: Recommended  Moderna SARS CoV2 Vaccine: 11/17/2021, 4/16/2021, 3/19/2021    Assessment / Plan:  1. BRITNEY (obstructive sleep apnea)    Patient report some continued issues with insomnia, limited caffeine intake to 1 cup of coffee per day and caffeine free tea.  She reports use of mouth tape with resolution of snoring and consequent avoidance of CPAP use.  She is advised to resume CPAP use with short-term follow-up to reassess.  Reminded to use distilled water only in humidifier chamber, okay to use mouth tape with  nasal pillow use.  Reviewed equipment cleaning and equipment replacement as well as use of distilled water only in humidifier unit.      Follow-up:   Return in about 3 months (around 7/28/2025) for Return with Laura Gonzalez PA-C.    Please note that this dictation was created using voice recognition software. I have made every reasonable attempt to correct obvious errors, but it is possible there are errors of grammar and possibly content that I did not discover before finalizing the note.

## 2025-08-11 ENCOUNTER — APPOINTMENT (OUTPATIENT)
Dept: URBAN - METROPOLITAN AREA CLINIC 31 | Facility: CLINIC | Age: 65
Setting detail: DERMATOLOGY
End: 2025-08-11

## 2025-08-11 DIAGNOSIS — L82.1 OTHER SEBORRHEIC KERATOSIS: ICD-10-CM

## 2025-08-11 DIAGNOSIS — L81.4 OTHER MELANIN HYPERPIGMENTATION: ICD-10-CM

## 2025-08-11 DIAGNOSIS — L98.8 OTHER SPECIFIED DISORDERS OF THE SKIN AND SUBCUTANEOUS TISSUE: ICD-10-CM

## 2025-08-11 PROCEDURE — ? COUNSELING

## 2025-08-11 PROCEDURE — ? ADDITIONAL NOTES

## 2025-08-11 ASSESSMENT — LOCATION ZONE DERM
LOCATION ZONE: TRUNK
LOCATION ZONE: FACE

## 2025-08-11 ASSESSMENT — LOCATION DETAILED DESCRIPTION DERM
LOCATION DETAILED: UPPER STERNUM
LOCATION DETAILED: RIGHT MEDIAL SUPERIOR CHEST
LOCATION DETAILED: RIGHT MEDIAL FOREHEAD

## 2025-08-11 ASSESSMENT — LOCATION SIMPLE DESCRIPTION DERM
LOCATION SIMPLE: CHEST
LOCATION SIMPLE: RIGHT FOREHEAD